# Patient Record
Sex: MALE | Race: WHITE
[De-identification: names, ages, dates, MRNs, and addresses within clinical notes are randomized per-mention and may not be internally consistent; named-entity substitution may affect disease eponyms.]

---

## 2018-03-11 ENCOUNTER — HOSPITAL ENCOUNTER (EMERGENCY)
Dept: HOSPITAL 47 - EC | Age: 58
Discharge: HOME | End: 2018-03-11
Payer: COMMERCIAL

## 2018-03-11 VITALS
HEART RATE: 63 BPM | SYSTOLIC BLOOD PRESSURE: 110 MMHG | DIASTOLIC BLOOD PRESSURE: 56 MMHG | RESPIRATION RATE: 17 BRPM | TEMPERATURE: 97.9 F

## 2018-03-11 DIAGNOSIS — Z87.891: ICD-10-CM

## 2018-03-11 DIAGNOSIS — N28.89: ICD-10-CM

## 2018-03-11 DIAGNOSIS — Z53.8: ICD-10-CM

## 2018-03-11 DIAGNOSIS — Z79.51: ICD-10-CM

## 2018-03-11 DIAGNOSIS — D72.829: ICD-10-CM

## 2018-03-11 DIAGNOSIS — I10: ICD-10-CM

## 2018-03-11 DIAGNOSIS — N43.3: ICD-10-CM

## 2018-03-11 DIAGNOSIS — K57.32: Primary | ICD-10-CM

## 2018-03-11 DIAGNOSIS — Z79.899: ICD-10-CM

## 2018-03-11 LAB
ANION GAP SERPL CALC-SCNC: 15 MMOL/L
BASOPHILS # BLD AUTO: 0.1 K/UL (ref 0–0.2)
BASOPHILS NFR BLD AUTO: 1 %
BUN SERPL-SCNC: 23 MG/DL (ref 9–20)
CALCIUM SPEC-MCNC: 9.8 MG/DL (ref 8.4–10.2)
CHLORIDE SERPL-SCNC: 104 MMOL/L (ref 98–107)
CO2 SERPL-SCNC: 19 MMOL/L (ref 22–30)
EOSINOPHIL # BLD AUTO: 0.2 K/UL (ref 0–0.7)
EOSINOPHIL NFR BLD AUTO: 2 %
ERYTHROCYTE [DISTWIDTH] IN BLOOD BY AUTOMATED COUNT: 5.23 M/UL (ref 4.3–5.9)
ERYTHROCYTE [DISTWIDTH] IN BLOOD: 12.5 % (ref 11.5–15.5)
GLUCOSE SERPL-MCNC: 147 MG/DL (ref 74–99)
HCT VFR BLD AUTO: 46 % (ref 39–53)
HGB BLD-MCNC: 15.8 GM/DL (ref 13–17.5)
LYMPHOCYTES # SPEC AUTO: 2.4 K/UL (ref 1–4.8)
LYMPHOCYTES NFR SPEC AUTO: 21 %
MCH RBC QN AUTO: 30.2 PG (ref 25–35)
MCHC RBC AUTO-ENTMCNC: 34.4 G/DL (ref 31–37)
MCV RBC AUTO: 87.8 FL (ref 80–100)
MONOCYTES # BLD AUTO: 0.7 K/UL (ref 0–1)
MONOCYTES NFR BLD AUTO: 6 %
NEUTROPHILS # BLD AUTO: 7.9 K/UL (ref 1.3–7.7)
NEUTROPHILS NFR BLD AUTO: 69 %
PH UR: 5 [PH] (ref 5–8)
PLATELET # BLD AUTO: 310 K/UL (ref 150–450)
POTASSIUM SERPL-SCNC: 3.9 MMOL/L (ref 3.5–5.1)
RBC UR QL: 1 /HPF (ref 0–5)
SODIUM SERPL-SCNC: 138 MMOL/L (ref 137–145)
SP GR UR: 1.02 (ref 1–1.03)
UROBILINOGEN UR QL STRIP: <2 MG/DL (ref ?–2)
WBC # BLD AUTO: 11.4 K/UL (ref 3.8–10.6)
WBC #/AREA URNS HPF: 4 /HPF (ref 0–5)

## 2018-03-11 PROCEDURE — 96372 THER/PROPH/DIAG INJ SC/IM: CPT

## 2018-03-11 PROCEDURE — 93975 VASCULAR STUDY: CPT

## 2018-03-11 PROCEDURE — 76870 US EXAM SCROTUM: CPT

## 2018-03-11 PROCEDURE — 36415 COLL VENOUS BLD VENIPUNCTURE: CPT

## 2018-03-11 PROCEDURE — 80048 BASIC METABOLIC PNL TOTAL CA: CPT

## 2018-03-11 PROCEDURE — 74177 CT ABD & PELVIS W/CONTRAST: CPT

## 2018-03-11 PROCEDURE — 96374 THER/PROPH/DIAG INJ IV PUSH: CPT

## 2018-03-11 PROCEDURE — 87086 URINE CULTURE/COLONY COUNT: CPT

## 2018-03-11 PROCEDURE — 99284 EMERGENCY DEPT VISIT MOD MDM: CPT

## 2018-03-11 PROCEDURE — 76770 US EXAM ABDO BACK WALL COMP: CPT

## 2018-03-11 PROCEDURE — 96376 TX/PRO/DX INJ SAME DRUG ADON: CPT

## 2018-03-11 PROCEDURE — 85025 COMPLETE CBC W/AUTO DIFF WBC: CPT

## 2018-03-11 PROCEDURE — 96375 TX/PRO/DX INJ NEW DRUG ADDON: CPT

## 2018-03-11 PROCEDURE — 81001 URINALYSIS AUTO W/SCOPE: CPT

## 2018-03-11 NOTE — ED
Abdominal Pain HPI





- General


Chief Complaint: Abdominal Pain


Stated Complaint: abdominal pain


Time Seen by Provider: 03/11/18 15:31


Source: patient, family (wife)


Mode of arrival: ambulatory


Limitations: no limitations





- History of Present Illness


Initial Comments: 


Patient presents with suprapubic abdominal pain for the past 4 hours.  Patient 

states it feels like gas pains.  States pain is intermittent.  States pain 

radiates down his penis and to region between scrotum and rectum. Pain worse 

when laying flat when he "stretches out the area".  Pain started after he went 

to the bathroom where he had a bowel movement and urinated.  Patient states 

urination has been normal, denies blood or frequency.  Patient denies penile 

discharge or testicular swelling or pain.  States he had 2 bowel movements today

, the first one was normal, second one was small amount of diarrhea.  She 

denies blood in urine or stools.  Patient denies history of abdominal surgeries 

or abdominal disease.  Patient denies history of kidney stones, flank pain, 

rectal pain.  Patient denies fevers, chills, nausea, vomiting.  Patient states 

he had a similar episode of pain 3 weeks ago was less severe and resolved on 

its own.








- Related Data


 Home Medications











 Medication  Instructions  Recorded  Confirmed


 


Benazepril/Hydrochlorothiazide 1 tab PO DAILY 04/24/15 03/11/18





[Lotensin Hct 20-12.5 mg Tablet]   


 


Fluticasone Nasal Spray [Flonase 1 spray EA NOSTRIL BID 03/11/18 03/11/18





Nasal Spray]   








 Previous Rx's











 Medication  Instructions  Recorded


 


Acetaminophen-Codeine 300-30mg 1 tab PO Q4H PRN 2 Days #12 tablet 03/11/18





[Tylenol #3]  


 


Amoxic-Pot Clav 875-125Mg 1 tab PO Q8HR 10 Days #30 tablet 03/11/18





[Augmentin 875-125]  


 


Ibuprofen [Motrin] 600 mg PO Q6HR PRN #20 tab 03/11/18











 Allergies











Allergy/AdvReac Type Severity Reaction Status Date / Time


 


No Known Allergies Allergy   Verified 03/11/18 16:02














Review of Systems


ROS Statement: 


Those systems with pertinent positive or pertinent negative responses have been 

documented in the HPI.





ROS Other: All systems not noted in ROS Statement are negative.


Constitutional: Denies: fever, chills, weakness


ENT: Denies: congestion


Respiratory: Denies: dyspnea


Cardiovascular: Denies: chest pain


Endocrine: Denies: fatigue


Gastrointestinal: Reports: abdominal pain, diarrhea.  Denies: nausea, vomiting, 

constipation, hematemesis, melena, hematochezia


Genitourinary: Reports: other (suprapubic pain radiating down penis).  Denies: 

urgency, dysuria, frequency, hematuria, discharge, testicular pain, testicular 

mass


Musculoskeletal: Denies: back pain


Skin: Denies: rash, change in color


Neurological: Denies: headache





Past Medical History


Past Medical History: Hypertension


History of Any Multi-Drug Resistant Organisms: None Reported


Past Surgical History: No Surgical Hx Reported


Additional Past Surgical History / Comment(s): SINUS SURG


Past Anesthesia/Blood Transfusion Reactions: Motion Sickness, Postoperative 

Nausea & Vomiting (PONV)


Past Psychological History: No Psychological Hx Reported


Smoking Status: Former smoker


Past Alcohol Use History: Occasional


Past Drug Use History: None Reported





- Past Family History


  ** Mother


Family Medical History: No Reported History





General Exam





- General Exam Comments


Initial Comments: 


Sitting up in bed.  No acute distress.  Conversing normally.  Calm, pleasant.  

Well appearing.





Limitations: no limitations


Head exam: Present: atraumatic, normocephalic


Eye exam: Present: normal appearance, PERRL, EOMI


ENT exam: Present: mucous membranes moist


Neck exam: Present: normal inspection


Respiratory exam: Present: normal lung sounds bilaterally.  Absent: respiratory 

distress, wheezes, rales, rhonchi, stridor


Cardiovascular Exam: Present: regular rate, normal rhythm


GI/Abdominal exam: Present: soft, tenderness (mild suprapubic TTP).  Absent: 

distended, guarding, hernia (No hernias appreciated)


 exam: Present: normal inspection, circumcision, other (Current testicular 

swelling or masses appreciated.  Normal testicular lie.  No hernias 

appreciated.  Penis and scrotum appear normal.  No rashes or lesions noted.  No 

abnormalities of the perineum appreciated on visual inspection.).  Absent: 

testicular tenderness, urethral discharge, scrotal swelling


Extremities exam: Present: other (No gross deformities)


Back exam: Present: normal inspection


Neurological exam: Present: alert, oriented X3


Psychiatric exam: Present: normal affect, normal mood


Skin exam: Present: warm, dry, intact, normal color.  Absent: rash, cyanosis, 

diaphoretic, erythema





Course


 Vital Signs











  03/11/18 03/11/18





  15:25 20:20


 


Temperature 97.0 F L 97.9 F


 


Pulse Rate 73 63


 


Respiratory 20 17





Rate  


 


Blood Pressure 144/97 110/56


 


O2 Sat by Pulse 99 100





Oximetry  














Medical Decision Making





- Medical Decision Making


Toradol, morphine, Bentyl ordered. 





US scrotum:


Ultrasound shows increased vascular flow to right testicle and epididymis 

suggestive mild right epididymoorchitis.  Small hydroceles are also 

incidentally noted, right greater than left





Mild elevation of WBC, UA negative, will get CT for possible intraabd 

infection. 





Pt with continued pain, 2nd dose morphine given. 





US abd:


CT shows acute uncomplicated sigmoid diverticulitis, mild.  Also shows 

suspicious solid right inferior pole renal mass





Pt and family updated with all results.  Symptoms likely secondary to 

diverticulitis.  At this time UA negative, do not feel patient has orchitis.  

Normal genital physical exam.  Diverticulitis.  Mild unconjugated, patient 

agrees with child outpatient treatment with antibiotics and Tylenol 3.  Patient 

also to take Motrin as needed.  Discussed return to ED if new or worsening 

symptoms including increased pain, fevers, at which time patient would need 

admitted for IV treatment.  Patient's family informed of right renal mass and 

need for follow with primary care physician for further management.  All 

questions answered.  Patient discharged home.





Patient feeling nauseated prior to discharge, request dose of antiemetics, 

Zofran given.  Nausea likely secondary to morphine.














- Lab Data


Result diagrams: 


 03/11/18 16:02





 03/11/18 16:02


 Lab Results











  03/11/18 03/11/18 03/11/18 Range/Units





  16:02 16:02 16:02 


 


WBC   11.4 H   (3.8-10.6)  k/uL


 


RBC   5.23   (4.30-5.90)  m/uL


 


Hgb   15.8   (13.0-17.5)  gm/dL


 


Hct   46.0   (39.0-53.0)  %


 


MCV   87.8   (80.0-100.0)  fL


 


MCH   30.2   (25.0-35.0)  pg


 


MCHC   34.4   (31.0-37.0)  g/dL


 


RDW   12.5   (11.5-15.5)  %


 


Plt Count   310   (150-450)  k/uL


 


Neutrophils %   69   %


 


Lymphocytes %   21   %


 


Monocytes %   6   %


 


Eosinophils %   2   %


 


Basophils %   1   %


 


Neutrophils #   7.9 H   (1.3-7.7)  k/uL


 


Lymphocytes #   2.4   (1.0-4.8)  k/uL


 


Monocytes #   0.7   (0-1.0)  k/uL


 


Eosinophils #   0.2   (0-0.7)  k/uL


 


Basophils #   0.1   (0-0.2)  k/uL


 


Sodium  138    (137-145)  mmol/L


 


Potassium  3.9    (3.5-5.1)  mmol/L


 


Chloride  104    ()  mmol/L


 


Carbon Dioxide  19 L    (22-30)  mmol/L


 


Anion Gap  15    mmol/L


 


BUN  23 H    (9-20)  mg/dL


 


Creatinine  0.85    (0.66-1.25)  mg/dL


 


Est GFR (CKD-EPI)AfAm  >90    (>60 ml/min/1.73 sqM)  


 


Est GFR (CKD-EPI)NonAf  >90    (>60 ml/min/1.73 sqM)  


 


Glucose  147 H    (74-99)  mg/dL


 


Calcium  9.8    (8.4-10.2)  mg/dL


 


Urine Color    Yellow  


 


Urine Appearance    Clear  (Clear)  


 


Urine pH    5.0  (5.0-8.0)  


 


Ur Specific Gravity    1.017  (1.001-1.035)  


 


Urine Protein    Negative  (Negative)  


 


Urine Glucose (UA)    Negative  (Negative)  


 


Urine Ketones    Negative  (Negative)  


 


Urine Blood    Trace H  (Negative)  


 


Urine Nitrite    Negative  (Negative)  


 


Urine Bilirubin    Negative  (Negative)  


 


Urine Urobilinogen    <2.0  (<2.0)  mg/dL


 


Ur Leukocyte Esterase    Small H  (Negative)  


 


Urine RBC    1  (0-5)  /hpf


 


Urine WBC    4  (0-5)  /hpf


 


Urine Mucus    Rare H  (None)  /hpf














Disposition


Clinical Impression: 


 Diverticulitis large intestine, Right kidney mass





Disposition: HOME SELF-CARE


Condition: Good


Instructions:  Diverticulitis (ED)


Additional Instructions: 


Follow-up to her primary care physician for reevaluation and further workup of 

kidney mass.  Return to ER for new or worsening symptoms including fevers, 

increased pain.


Prescriptions: 


Acetaminophen-Codeine 300-30mg [Tylenol #3] 1 tab PO Q4H PRN 2 Days #12 tablet


 PRN Reason: Severe Pain


Amoxic-Pot Clav 875-125Mg [Augmentin 875-125] 1 tab PO Q8HR 10 Days #30 tablet


Ibuprofen [Motrin] 600 mg PO Q6HR PRN #20 tab


 PRN Reason: Mild Pain


Referrals: 


Chela Daniel MD [Primary Care Provider] - 1-2 days

## 2018-03-11 NOTE — US
EXAMINATION TYPE: US scrotum with doppler.  Grayscale and color Doppler Duplex imaging performed of t
chidi scrotum.

 

DATE OF EXAM: 3/11/2018

 

COMPARISON: NONE

 

CLINICAL HISTORY: Pain. Generalized pain, no swelling, 

 

 

EXAM MEASUREMENTS:

 

TESTICLES:

Right Testicle:  4.2 x 3.2 x 2.5  cm

Left Testicle:  3.7 x 3.1 x 2.4 cm

 

EPIDIDYMIS HEAD:

Right Epididymis:  1.1 x 1.4 x 0.9 cm

Left Epididymis:  0.9 x 1.2 x 0.7  cm  

 

Doppler performed to assess for testicular vascularity; good bilateral color flow and waveforms are s
een with bright blood flow slightly greater than left.   There is no evidence of testicular torsion.

 

Presence of hydroceles:  Bilateral, right > left

 

 

Right epididymis appears echogenic and heterogenous. No prominent testicular lesions or masses.  

 

 

 

IMPRESSION: Increased vascular flow to the right testicle and epididymis heterogeneity suggests mild 
right epididymoorchitis. Small hydroceles are also incidentally noted, right greater than left.

## 2018-03-11 NOTE — CT
EXAMINATION TYPE: CT abdomen pelvis w con

 

DATE OF EXAM: 3/11/2018

 

COMPARISON: NONE

 

HISTORY: Patient complains of subumbilical pain that extends to the scrotum.

 

CT DLP: 887.9 mGycm

Automated exposure control for dose reduction was used.

 

TECHNIQUE:  Helical acquisition of images was performed from the lung bases through the pelvis.

 

CONTRAST: 

Performed without Oral Contrast and with IV Contrast, patient injected with 100 mL of Omnipaque 300.

 

FINDINGS: 

 

LUNG BASES: There is a small hiatal hernia and scattered areas of subsegmental atelectasis.

 

LIVER/GB: No significant abnormality is appreciated. No cholelithiasis.

 

PANCREAS: No significant abnormality is seen. No ductal dilatation.

 

SPLEEN: No significant abnormality is seen. No splenomegaly.

 

ADRENALS: No significant abnormality is seen.

 

KIDNEYS: There is a suspicious solid mass of the inferior pole the right kidney emanating posteriorly
 measuring 2.5 x 3.0 x 3.2 cm and anterior posterior by transverse by craniocaudal dimension on serie
s 3 image 33 and series 8 image 72. Other scattered bilateral hypoattenuated renal lesions that are s
ubcentimeter are too small to accurately characterize. Right upper pole nearly fluid attenuated 1.3 c
m lesion representing probable renal cyst with pseudoenhancement. Renal ultrasound could be performed
 for confirmation. No gross evidence of renal vein thrombosis. The primary mass of the right lower po
le does abut and inferior minor and major calyx.

 

FREE AIR:  No free air is visualized.

 

REPRODUCTIVE ORGANS: No significant abnormality is seen

 

URINARY BLADDER:  No significant abnormality is seen.

 

ADENOPATHY:  No greater than 1 cm short axis lymph nodes are seen within the abdomen or pelvis. Just 
inferior to the portacaval region anterior to the inferior vena cava on series 3 image 24 there is a 
prominent 8 mm short axis lymph node.

 

OSSEOUS STRUCTURES:  Osseous structures are intact.

 

BOWEL:  Mild pericolonic fat stranding is seen of the sigmoid colon surrounding multiple diverticula 
with fascial thickening. No pericolonic fluid collection or foci of free air. No ascites. There is lo
ng segment bowel wall thickening of the sigmoid colon. Findings are limited to the sigmoid colon with
 no proximal obstruction or dilation. Appendix is air-filled and within normal limits.

 

IMPRESSION: 

 

1. ACUTE UNCOMPLICATED SIGMOID DIVERTICULITIS, OVERALL MILD IN DEGREE.

2. SUSPICIOUS SOLID RIGHT INFERIOR POLE RENAL MASS. THIS SHOULD BE CONSIDERED RENAL CELL CARCINOMA UN
TIL PROVEN OTHERWISE ALTHOUGH ONCOCYTOMA IS ALSO IN THE DIFFERENTIAL. ADDITIONAL BILATERAL HYPOATTENU
ATED RENAL LESIONS ARE TOO SMALL TO ACCURATELY CHARACTERIZE BUT LIKELY REPRESENT CYSTS. UPPER POLE RI
GHT RENAL LESION LIKELY REPRESENTS A CYST WITH SIGNAL ENHANCEMENT AND COULD BE CONFIRMED WITH ULTRASO
UND.

3.

## 2018-03-11 NOTE — US
EXAMINATION TYPE: US kidneys/renal and bladder

 

DATE OF EXAM: 3/11/2018

 

COMPARISON: NONE

 

CLINICAL HISTORY: Pain. Pain radiating to scrotum.  No hx of renal stones. 

 

EXAM MEASUREMENTS:

 

Right Kidney:  9.8 x 5.3 x 5.4 cm

Left Kidney: 10.1 x 5.4 x 5.6 cm

 

 

 

Right Kidney: wnl  

Left Kidney: Lateral subcentimeter appearing lesion = 0.6 x 0.7 x 0.6 cm. Although this is too small 
to characterize and slightly hypoechoic.  Prominent column of champ   

Bladder: distended, wnl

**Bilateral Jets seen

 

 

There is no evidence for hydronephrosis at this point in time.  No nephrolithiasis is seen.    The ur
inary bladder is anechoic.  Bilateral ureteral jets are seen.

 

 

 

IMPRESSION:

 

1. No evidence of hydronephrosis or nephrolithiasis.

2. Subcentimeter left renal lesion that is too small to characterize.

## 2018-03-19 ENCOUNTER — HOSPITAL ENCOUNTER (OUTPATIENT)
Dept: HOSPITAL 47 - LABWHC1 | Age: 58
Discharge: HOME | End: 2018-03-19
Payer: COMMERCIAL

## 2018-03-19 DIAGNOSIS — D41.01: Primary | ICD-10-CM

## 2018-03-19 LAB
ALBUMIN SERPL-MCNC: 4.4 G/DL (ref 3.5–5)
ALP SERPL-CCNC: 74 U/L (ref 38–126)
ALT SERPL-CCNC: 74 U/L (ref 21–72)
AST SERPL-CCNC: 43 U/L (ref 17–59)
BILIRUB INDIRECT SERPL-MCNC: 0 MG/DL (ref 0–1.1)
BILIRUBIN DIRECT+TOT PNL SERPL-MCNC: 0.3 MG/DL (ref 0–0.2)
PROT SERPL-MCNC: 7.5 G/DL (ref 6.3–8.2)

## 2018-03-19 PROCEDURE — 80076 HEPATIC FUNCTION PANEL: CPT

## 2018-03-19 PROCEDURE — 71046 X-RAY EXAM CHEST 2 VIEWS: CPT

## 2018-03-19 PROCEDURE — 36415 COLL VENOUS BLD VENIPUNCTURE: CPT

## 2018-03-19 NOTE — XR
EXAMINATION TYPE: XR chest 2V

 

DATE OF EXAM: 3/19/2018

 

COMPARISON: NONE

 

TECHNIQUE: PA and lateral views submitted.

 

HISTORY: Renal mass

 

FINDINGS:

The lungs are clear and  there is no pneumothorax, pleural effusion, or focal pneumonia.  No overt fa
ilure. Chronic left clavicular deformity.

 

IMPRESSION: 

1. No acute process.

## 2019-10-10 ENCOUNTER — HOSPITAL ENCOUNTER (OUTPATIENT)
Dept: HOSPITAL 47 - RADCTMAIN | Age: 59
Discharge: HOME | End: 2019-10-10
Attending: INTERNAL MEDICINE
Payer: COMMERCIAL

## 2019-10-10 DIAGNOSIS — K44.9: ICD-10-CM

## 2019-10-10 DIAGNOSIS — N28.89: ICD-10-CM

## 2019-10-10 DIAGNOSIS — K57.30: Primary | ICD-10-CM

## 2019-10-10 LAB — BUN SERPL-SCNC: 18 MG/DL (ref 9–20)

## 2019-10-10 PROCEDURE — 82565 ASSAY OF CREATININE: CPT

## 2019-10-10 PROCEDURE — 84520 ASSAY OF UREA NITROGEN: CPT

## 2019-10-10 PROCEDURE — 74177 CT ABD & PELVIS W/CONTRAST: CPT

## 2019-10-10 PROCEDURE — 36415 COLL VENOUS BLD VENIPUNCTURE: CPT

## 2019-10-10 NOTE — CT
EXAMINATION TYPE: CT abdomen pelvis w con

 

DATE OF EXAM: 10/10/2019

 

COMPARISON: 3/11/2018

 

HISTORY: 59-year-old male generalized pain

 

TECHNIQUE: Contiguous axial scanning of the abdomen and pelvis following administration of 100 ml Iso
thom 300 IV contrast.  Delayed images through the kidneys and coronal/sagittal reconstructions perform
ed.

 

CT DLP: 1153 mGycm

Automated exposure control for dose reduction was used.

 

 

FINDINGS:

Heart normal size without pericardial effusion. Lung bases clear without pleural effusion.

 

Tiny hiatal hernia.

 

Liver normal size with decreased attenuation suggesting underlying fatty infiltration. No biliary dariana
gilberto dilatation. Portal venous system is patent.

 

Gallbladder, adrenal glands, spleen, and pancreas appear within normal limits.

 

1.1 cm cortical hypodensity posterior left kidney is unchanged suggestive of a small cortical cyst.

 

The previous posterior right renal mass has been excised in the interval. There is cortical defect an
d some heterogeneous density in this region suggesting postsurgical change. No suspicious nodularity 
is identified.

 

Mild nodular thickening of the left adrenal gland up to 1 cm thick is new from 3/11/2018. Follow-up i
s recommended.

 

Scattered nonenlarged mesenteric lymph nodes.

 

No dilated small bowel, free fluid, or free air.

 

Normal appendix. Moderate stool burden. Oral contrast progressed to the hepatic flexure.

 

Extensive sigmoid diverticulosis. Minimal fat stranding adjacent to the mid sigmoid colon, axial imag
e 65 probably represents prominent pericolonic vessels.

 

Bladder urine distended. Prostate gland measures 4.7 cm wide. No abnormal fluid collection in the pel
vis or pelvic lymphadenopathy.

 

Bones: Facet arthropathy lower lumbar spine. No osseous destructive process.

 

 

 

IMPRESSION: 

 

1. EXTENSIVE SIGMOID DIVERTICULOSIS. MILD FAT STRANDING ALONG THE MID SIGMOID COLON PROBABLY REPRESEN
TS PROMINENT PERICOLONIC VESSELS OR SCARRING FROM PRIOR EPISODES OF DIVERTICULITIS. MINIMAL INFLAMMAT
ION RELATING TO MILD ACUTE DIVERTICULITIS DIFFICULT TO ENTIRELY EXCLUDE.

 

2. INTERVAL RESECTION OF THE PATIENT'S PREVIOUS RIGHT RENAL MASS. THERE IS CORTICAL DEFECT. SOME HETE
ROGENEOUS DENSITY, LIKELY POSTSURGICAL CHANGE. APPROPRIATE UROLOGIC FOLLOW-UP RECOMMENDED.

 

3. NEW NODULAR THICKENING OF THE LEFT ADRENAL GLAND MEASURING UP TO 1 CM. 6 MONTH FOLLOW-UP CT RECOMM
ENDED TO REASSESS.

 

4. TINY HIATAL HERNIA.

## 2020-06-19 ENCOUNTER — HOSPITAL ENCOUNTER (OUTPATIENT)
Dept: HOSPITAL 47 - RADCTMAIN | Age: 60
Discharge: HOME | End: 2020-06-19
Attending: FAMILY MEDICINE
Payer: COMMERCIAL

## 2020-06-19 DIAGNOSIS — E27.8: Primary | ICD-10-CM

## 2020-06-19 DIAGNOSIS — Z98.890: ICD-10-CM

## 2020-06-19 PROCEDURE — 74170 CT ABD WO CNTRST FLWD CNTRST: CPT

## 2020-06-21 NOTE — CT
EXAMINATION TYPE: CT adrenal glands wo/w con

 

DATE OF EXAM: 6/19/2020

 

COMPARISON: 10/10/2019 and 3/11/2018

 

HISTORY: 60-year-old male adrenal mass, history of renal cell carcinoma

 

TECHNIQUE: Contiguous axial scanning of the abdomen performed without and with IV Contrast, patient i
njected with 100 mL of Isovue 300. Delayed images were also obtained per adrenal mass protocol. Coron
al/sagittal reconstructions performed.

 

CT DLP: 2187 mGycm

Automated exposure control for dose reduction was used.

 

FINDINGS: 

The heart is normal size without pericardial effusion. Lung bases clear without pleural effusion.

 

Small hiatal hernia.

 

Diffuse low-density of the liver compatible with fatty infiltration. Portal venous system is patent. 
No biliary ductal dilatation.

 

Gallbladder, spleen, pancreas appear within normal limits.

 

Very subtle new 8 mm nodularity right adrenal gland difficult to exclude at this time.

 

Stable postsurgical changes of partial nephrectomy along the posterior lower pole of the right kidney
. Stable 9 mm centrally located hypodensity in the midpole the right kidney suggestive of a cyst.

 

Stable 1.5 cm cortical cyst posterior midpole left kidney.

 

Continued nodular enlargement of the left adrenal gland measuring 3.4 cm versus 2.7 cm, previously. T
hickening medially measures up to 1.8 cm and laterally at 1.4 cm (versus 1.1 and 0.9 cm, previously).


 

Noncontrast attenuation is 38 Hounsfield units.

Postcontrast enhancement is 84 Hounsfield units.

Delayed enhancement is 51 Hounsfield units.

 

No mesenteric or retroperitoneal lymphadenopathy. No dilated small bowel, free fluid, or free air.

 

Normal appendix. Oral contrast progressed to the hepatic flexure. Mild-to-moderate stool. Extensive s
igmoid diverticulosis. No pericolonic inflammatory change.

 

Mild circumferential bladder wall thickening. Prostate gland again noted to be enlarged measuring 5.0
 cm wide. No abnormal fluid collection the pelvis or pelvic lymphadenopathy.

 

Bones: No osseous destructive process.

 

 

IMPRESSION: 

 

1. CONTINUED NODULAR ENLARGEMENT OF THE LEFT ADRENAL GLAND CURRENTLY MEASURING UP TO 3.4 CM VERSUS 2.
7 CM ON 10/10/2019 AND NEW AS COMPARED TO 3/11/2018. NOTE THAT WASHOUT MEASUREMENTS MAY HAVE SIGNIFIC
ANTLY REDUCED SPECIFICITY IN PATIENTS WITH RENAL CELL CARCINOMA. SO, ADRENAL METASTASES CANNOT BE EXC
LUDED DESPITE THE ABSOLUTE WASHOUT OF GREATER THAN 60%.

 

2. DEVELOPMENT OF SUBTLE NEW 8 MM NODULARITY OF THE RIGHT ADRENAL GLAND.

 

3. STABLE POSTRESECTION CHANGES ALONG THE LOWER POLE RIGHT KIDNEY. NO LOCAL RECURRENCE IDENTIFIED.

 

4. SMALL HIATAL HERNIA AND SIGMOID DIVERTICULOSIS. PROSTATOMEGALY AT 5.0 CM WIDE.

## 2020-07-21 ENCOUNTER — HOSPITAL ENCOUNTER (OUTPATIENT)
Dept: HOSPITAL 47 - LABPAT | Age: 60
Discharge: HOME | End: 2020-07-21
Attending: UROLOGY
Payer: COMMERCIAL

## 2020-07-21 DIAGNOSIS — Z01.818: Primary | ICD-10-CM

## 2020-07-21 DIAGNOSIS — E27.9: ICD-10-CM

## 2020-07-21 LAB
ALBUMIN SERPL-MCNC: 4.5 G/DL (ref 3.5–5)
ALP SERPL-CCNC: 49 U/L (ref 38–126)
ALT SERPL-CCNC: 49 U/L (ref 4–49)
ANION GAP SERPL CALC-SCNC: 8 MMOL/L
AST SERPL-CCNC: 36 U/L (ref 17–59)
BASOPHILS # BLD AUTO: 0.1 K/UL (ref 0–0.2)
BASOPHILS NFR BLD AUTO: 1 %
BUN SERPL-SCNC: 19 MG/DL (ref 9–20)
CALCIUM SPEC-MCNC: 9.5 MG/DL (ref 8.4–10.2)
CHLORIDE SERPL-SCNC: 102 MMOL/L (ref 98–107)
CO2 SERPL-SCNC: 28 MMOL/L (ref 22–30)
EOSINOPHIL # BLD AUTO: 0.2 K/UL (ref 0–0.7)
EOSINOPHIL NFR BLD AUTO: 2 %
ERYTHROCYTE [DISTWIDTH] IN BLOOD BY AUTOMATED COUNT: 4.85 M/UL (ref 4.3–5.9)
ERYTHROCYTE [DISTWIDTH] IN BLOOD: 12.7 % (ref 11.5–15.5)
GLUCOSE SERPL-MCNC: 138 MG/DL (ref 74–99)
HCT VFR BLD AUTO: 44.9 % (ref 39–53)
HGB BLD-MCNC: 15.2 GM/DL (ref 13–17.5)
LYMPHOCYTES # SPEC AUTO: 2.9 K/UL (ref 1–4.8)
LYMPHOCYTES NFR SPEC AUTO: 34 %
MCH RBC QN AUTO: 31.4 PG (ref 25–35)
MCHC RBC AUTO-ENTMCNC: 33.9 G/DL (ref 31–37)
MCV RBC AUTO: 92.6 FL (ref 80–100)
MONOCYTES # BLD AUTO: 0.7 K/UL (ref 0–1)
MONOCYTES NFR BLD AUTO: 8 %
NEUTROPHILS # BLD AUTO: 4.5 K/UL (ref 1.3–7.7)
NEUTROPHILS NFR BLD AUTO: 53 %
PLATELET # BLD AUTO: 256 K/UL (ref 150–450)
POTASSIUM SERPL-SCNC: 4.5 MMOL/L (ref 3.5–5.1)
PROT SERPL-MCNC: 7.4 G/DL (ref 6.3–8.2)
SODIUM SERPL-SCNC: 138 MMOL/L (ref 137–145)
WBC # BLD AUTO: 8.5 K/UL (ref 3.8–10.6)

## 2020-07-21 PROCEDURE — 36415 COLL VENOUS BLD VENIPUNCTURE: CPT

## 2020-07-21 PROCEDURE — 80053 COMPREHEN METABOLIC PANEL: CPT

## 2020-07-21 PROCEDURE — 85025 COMPLETE CBC W/AUTO DIFF WBC: CPT

## 2020-08-13 ENCOUNTER — HOSPITAL ENCOUNTER (OUTPATIENT)
Dept: HOSPITAL 47 - OR | Age: 60
Setting detail: OBSERVATION
LOS: 1 days | Discharge: HOME | End: 2020-08-14
Attending: UROLOGY | Admitting: UROLOGY
Payer: COMMERCIAL

## 2020-08-13 VITALS — BODY MASS INDEX: 32.5 KG/M2

## 2020-08-13 DIAGNOSIS — C79.72: Primary | ICD-10-CM

## 2020-08-13 DIAGNOSIS — Z90.5: ICD-10-CM

## 2020-08-13 DIAGNOSIS — C64.1: ICD-10-CM

## 2020-08-13 DIAGNOSIS — Z79.899: ICD-10-CM

## 2020-08-13 DIAGNOSIS — Z82.61: ICD-10-CM

## 2020-08-13 DIAGNOSIS — Z87.891: ICD-10-CM

## 2020-08-13 DIAGNOSIS — I10: ICD-10-CM

## 2020-08-13 DIAGNOSIS — N40.1: ICD-10-CM

## 2020-08-13 DIAGNOSIS — K66.0: ICD-10-CM

## 2020-08-13 PROCEDURE — 49329 UNLSTD LAPS PX ABD PERTM&OMN: CPT

## 2020-08-13 PROCEDURE — 86850 RBC ANTIBODY SCREEN: CPT

## 2020-08-13 PROCEDURE — 88313 SPECIAL STAINS GROUP 2: CPT

## 2020-08-13 PROCEDURE — 86900 BLOOD TYPING SEROLOGIC ABO: CPT

## 2020-08-13 PROCEDURE — 88342 IMHCHEM/IMCYTCHM 1ST ANTB: CPT

## 2020-08-13 PROCEDURE — 88307 TISSUE EXAM BY PATHOLOGIST: CPT

## 2020-08-13 PROCEDURE — 86901 BLOOD TYPING SEROLOGIC RH(D): CPT

## 2020-08-13 PROCEDURE — 60650 LAPAROSCOPY ADRENALECTOMY: CPT

## 2020-08-13 PROCEDURE — 88341 IMHCHEM/IMCYTCHM EA ADD ANTB: CPT

## 2020-08-13 RX ADMIN — HEPARIN SODIUM SCH UNIT: 5000 INJECTION, SOLUTION INTRAVENOUS; SUBCUTANEOUS at 23:47

## 2020-08-13 RX ADMIN — POTASSIUM CHLORIDE SCH: 14.9 INJECTION, SOLUTION INTRAVENOUS at 16:01

## 2020-08-13 RX ADMIN — POTASSIUM CHLORIDE SCH MLS: 14.9 INJECTION, SOLUTION INTRAVENOUS at 11:15

## 2020-08-13 RX ADMIN — POTASSIUM CHLORIDE SCH MLS/HR: 14.9 INJECTION, SOLUTION INTRAVENOUS at 16:19

## 2020-08-13 RX ADMIN — HEPARIN SODIUM SCH UNIT: 5000 INJECTION, SOLUTION INTRAVENOUS; SUBCUTANEOUS at 17:10

## 2020-08-13 RX ADMIN — POTASSIUM CHLORIDE SCH: 14.9 INJECTION, SOLUTION INTRAVENOUS at 20:36

## 2020-08-13 RX ADMIN — POTASSIUM CHLORIDE ONE MLS/HR: 14.9 INJECTION, SOLUTION INTRAVENOUS at 16:10

## 2020-08-13 RX ADMIN — KETOROLAC TROMETHAMINE SCH MG: 15 INJECTION, SOLUTION INTRAMUSCULAR; INTRAVENOUS at 23:51

## 2020-08-13 RX ADMIN — HYDROMORPHONE HYDROCHLORIDE PRN MG: 1 INJECTION, SOLUTION INTRAMUSCULAR; INTRAVENOUS; SUBCUTANEOUS at 20:38

## 2020-08-13 RX ADMIN — HYDROMORPHONE HYDROCHLORIDE PRN MG: 1 INJECTION, SOLUTION INTRAMUSCULAR; INTRAVENOUS; SUBCUTANEOUS at 14:15

## 2020-08-13 RX ADMIN — HYDROMORPHONE HYDROCHLORIDE PRN MG: 1 INJECTION, SOLUTION INTRAMUSCULAR; INTRAVENOUS; SUBCUTANEOUS at 14:24

## 2020-08-13 RX ADMIN — KETOROLAC TROMETHAMINE SCH MG: 15 INJECTION, SOLUTION INTRAMUSCULAR; INTRAVENOUS at 17:10

## 2020-08-13 RX ADMIN — POTASSIUM CHLORIDE ONE MLS: 14.9 INJECTION, SOLUTION INTRAVENOUS at 13:46

## 2020-08-13 RX ADMIN — POTASSIUM CHLORIDE SCH MLS/HR: 14.9 INJECTION, SOLUTION INTRAVENOUS at 23:48

## 2020-08-13 NOTE — P.OP
Date of Procedure: 08/13/20


Preoperative Diagnosis: 


Left adrenal mass


Postoperative Diagnosis: 


Extensive adhesions of omentum and large bowel


Left adrenal mass


Procedure(s) Performed: 


#1 robotic left partial adrenalectomy


#2 extensive lysis of adhesions


Implants: 


None


Anesthesia: GETA


Surgeon: Lakia Montes


Estimated Blood Loss (ml): 20


IV fluids (ml): 400


Urine output (ml): 100


Pathology: other (Left adrenal tumor)


Condition: stable


Disposition: PACU


Indications for Procedure: 


Enlarging left adrenal mass that increased from 2.64-3.7 cm.  Given the past 

history off kidney cancer, there was a suspicion that the adrenal tumor was a 

metastasis


Operative Findings: 


#1 there were extensive adhesions between the omentum and large bowel in the 

left upper quadrant.  These required an extensive lysis of adhesions with 

laparoscopy.  The adrenal tumor was seen in the suprarenal location.  Large 

pancreas


Description of Procedure: 


jin was evaluated in the office for an enlarging left adrenal mass.  With 

the suspicion of RCC metastasis elected to undergo a left partial adrenalectomy.

 All risks and complications were explained to him including bleeding, spleen 

pancreas injury, need for nephrectomy, etc





He was taken to the OR and administered general anesthesia and placed in the 

left lateral position.  All parts were padded.  He was prepped and draped.  

Using a Veress needle pneumoperitoneum was established to 20 mmHg.  A 8 mm 

robotic port was placed in the midclavicular line.  The 0 lens was introduced 

into the belly.  Inspection of the peritoneum revealed extensive omental and 

large bowel adhesions in the upper left quadrant of the abdomen.  2 additional 8

mm ports were placed in the midclavicular line below the camera port.  Using 

laparoscopic scissors the omental and large bowel adhesions were lysed.  The 

spleen was also stuck to the left lateral wall.  Once the adhesions were lysed. 

A fourth port was placed in the midclavicular line above the camera port.  The 

robot was docked and a monopolar scissor, fenestrated bipolar and Prograf 6 

forceps were used for the procedure.





The procedure was started by incising along the line of Toldt and the descending

colon and sigmoid colon were reflected medially.  The procedure was directed 

superiorly towards the splenic flexure.  The spleen was then identified and the 

lienorenal ligament was divided.  The entire large bowel was reflected medially 

to the level of the aorta.  Superiorly the spleen and the pancreas was 

completely reflected medially until the splenic vein and splenic artery was 

identified.  At this point the adrenal tumor could be seen and outlined.





The fourth arm was used to reflect the spleen and pancreas medially and the 

monopolar scissors and fenestrated bipolar was used to define a plane between 

the adrenal tumor and the renal hilum.  The splenic vein was identified and 

clipped on the body side and coagulated with the vessel sealer and divided.  A 

plane was then developed between the lower border of the adrenal mass and the 

kidney and renal hilum.  This plane was deepened down to the psoas muscle.  

Attention was then directed to the medial aspect of the adrenal mass and using 

vessel sealer was all the small bowel vessels entering the adrenal tumor were 

divided and coagulated.  The bipolar was used to conform hemostasis.





Once the inferior and medial margins of the adrenal tumor were freed attention 

was then directed to the superior margin between the normal adrenal gland and 

the adrenal tumor.  Using the vessel sealer the tumor was  from the 

normal adrenal gland and hemostasis was confirmed.  Care was taken to keep an 

adequate margin around the adrenal tumor.  Finally the lateral aspect of the 

adrenal tumor was divided with the vessel sealer.  Once the entire adrenal was 

freed it was placed in an Endo Catch bag and attention was then directed to the 

foci to conform hemostasis with the bipolar forceps.





10 mL of Tisseel were then placed in the adrenal fossa as an adjunct for 

hemostasis.  A Surgicel piece was also placed in that fossa.





The specimen was then delivered by enlarging the 12 mm assistant port.  The 12 

mm incision was then closed with figure of 8 of 0 Vicryl on a CT1 needle.  The 

subcu and skin were closed with 4-0 Monocryl.  And skin glue was applied.  The 

patient tolerated the procedure well and he was taken to recovery in stable 

condition

## 2020-08-14 VITALS
TEMPERATURE: 97.7 F | HEART RATE: 77 BPM | SYSTOLIC BLOOD PRESSURE: 147 MMHG | RESPIRATION RATE: 20 BRPM | DIASTOLIC BLOOD PRESSURE: 85 MMHG

## 2020-08-14 RX ADMIN — HYDROMORPHONE HYDROCHLORIDE PRN MG: 1 INJECTION, SOLUTION INTRAMUSCULAR; INTRAVENOUS; SUBCUTANEOUS at 07:54

## 2020-08-14 RX ADMIN — POTASSIUM CHLORIDE SCH MLS/HR: 14.9 INJECTION, SOLUTION INTRAVENOUS at 11:10

## 2020-08-14 RX ADMIN — HEPARIN SODIUM SCH UNIT: 5000 INJECTION, SOLUTION INTRAVENOUS; SUBCUTANEOUS at 07:48

## 2020-08-14 RX ADMIN — HYDROMORPHONE HYDROCHLORIDE PRN MG: 1 INJECTION, SOLUTION INTRAMUSCULAR; INTRAVENOUS; SUBCUTANEOUS at 02:29

## 2020-08-14 RX ADMIN — POTASSIUM CHLORIDE SCH: 14.9 INJECTION, SOLUTION INTRAVENOUS at 04:27

## 2020-08-14 RX ADMIN — HYDROMORPHONE HYDROCHLORIDE PRN MG: 1 INJECTION, SOLUTION INTRAMUSCULAR; INTRAVENOUS; SUBCUTANEOUS at 14:53

## 2020-08-14 RX ADMIN — POTASSIUM CHLORIDE SCH MLS/HR: 14.9 INJECTION, SOLUTION INTRAVENOUS at 06:09

## 2020-08-14 RX ADMIN — KETOROLAC TROMETHAMINE SCH MG: 15 INJECTION, SOLUTION INTRAMUSCULAR; INTRAVENOUS at 06:08

## 2020-08-14 RX ADMIN — KETOROLAC TROMETHAMINE SCH MG: 15 INJECTION, SOLUTION INTRAMUSCULAR; INTRAVENOUS at 11:09

## 2020-12-01 ENCOUNTER — HOSPITAL ENCOUNTER (OUTPATIENT)
Dept: HOSPITAL 47 - RADCTMAIN | Age: 60
Discharge: HOME | End: 2020-12-01
Attending: INTERNAL MEDICINE
Payer: COMMERCIAL

## 2020-12-01 DIAGNOSIS — E27.8: Primary | ICD-10-CM

## 2020-12-01 DIAGNOSIS — K57.30: ICD-10-CM

## 2020-12-01 DIAGNOSIS — N40.0: ICD-10-CM

## 2020-12-01 DIAGNOSIS — C64.1: ICD-10-CM

## 2020-12-01 DIAGNOSIS — K63.89: ICD-10-CM

## 2020-12-01 DIAGNOSIS — Z98.890: ICD-10-CM

## 2020-12-01 LAB — BUN SERPL-SCNC: 19 MG/DL (ref 9–20)

## 2020-12-01 PROCEDURE — 74177 CT ABD & PELVIS W/CONTRAST: CPT

## 2020-12-01 PROCEDURE — 71260 CT THORAX DX C+: CPT

## 2020-12-01 PROCEDURE — 82565 ASSAY OF CREATININE: CPT

## 2020-12-01 PROCEDURE — 36415 COLL VENOUS BLD VENIPUNCTURE: CPT

## 2020-12-01 PROCEDURE — 84520 ASSAY OF UREA NITROGEN: CPT

## 2020-12-02 NOTE — CT
EXAMINATION TYPE: CT ChestAbdPelvis w con

 

DATE OF EXAM: 12/1/2020

 

COMPARISON: 6/19/2020, 10/10/2019, 3/11/2018

 

HISTORY: 60-year-old male C64.1, follow-up Renal CA

 

TECHNIQUE: Contiguous axial scanning of the chest, abdomen, and pelvis performed with IV Contrast, pa
tient injected with 100 mL of Isovue 300. Delayed images through the kidneys were obtained. Coronal/s
agittal reconstructions performed.

 

CT DLP: 1539.6 mGycm

Automated exposure control for dose reduction was used.

 

FINDINGS: 

 

CHEST:

Heart normal size without pericardial effusion.

 

Ectatic ascending aorta 3.6 cm with bovine configuration to the aortic arch.

 

Trace bilateral gynecomastia. No thoracic lymphadenopathy by CT size criteria.

 

Some strandy atelectasis in the inferior lingula. No consolidation or pleural effusion.

 

 

 

ABDOMEN:

No focal liver lesion or biliary ductal dilatation. Portal venous system is patent.

 

Gallbladder, spleen, and pancreas appear within normal limits.

 

Stable 1.3 cm cortical cyst posterolateral left kidney.

 

Stable small postsurgical defect posteromedial aspect of the lower pole right kidney corresponding to
 the site of previous tumor resection. No suspicious mass or new nodularity is identified in this reg
ion.

 

The previous left adrenal mass has decreased in size previously measuring 3.4 x 1.5 cm and now measur
ing 2.0 x 1.1 cm and with lower density and less definition. Some surrounding strandy densities are p
resent as well. Query any interval treatment or surgery.

 

However, the right adrenal nodularity is stable to minimally more pronounced at 1.1 cm versus 8 mm, p
reviously.

 

Some scarring along the anterior abdominal wall related to prior laparoscopic portals.

 

No dilated small bowel, free fluid, or free air. No mesenteric or retroperitoneal lymphadenopathy.

 

Normal appendix. Oral contrast has progressed to the lower descending colon. Extensive sigmoid divert
iculosis. There is some wall thickening along the midsigmoid and some pericolonic fat stranding, refe
r to axial images 96 through 101.

 

 

PELVIS:

Bladder is urine distended. Prostate gland enlargement 5.2 cm wide. No abnormal fluid collection in t
he pelvis or pelvic lymphadenopathy.

 

 

BONES:

Hypertrophic facet arthropathy lower lumbar spine. No osseous destructive process.

 

 

 

IMPRESSION: 

 

1. STABLE POST RESECTION CHANGES ALONG THE LOWER POLE OF THE RIGHT KIDNEY. NO EVIDENCE FOR LOCAL RECU
RRENCE.

 

2. THE LEFT ADRENAL MASS HAS DECREASED IN SIZE MEASURING 2.0 X 1.1 CM (VERSUS 3.4 X 1.5 CM, PREVIOUSL
Y) AND NOW SHOWS LOWER DENSITY AND ILL-DEFINITION. QUERY ANY INTERVAL TREATMENT OR SURGERY. CONTINUED
 FOLLOW-UP RECOMMENDED.

 

3. HOWEVER, THE RIGHT ADRENAL NODULE IS STABLE TO MINIMALLY MORE PRONOUNCED AT 1.1 CM VERSUS 8 MM, NH
EVIOUSLY. AGAIN, CONTINUED FOLLOW-UP RECOMMENDED. 

 

4. REDEMONSTRATED SIGMOID DIVERTICULOSIS. THERE IS MILD PERICOLONIC FAT STRANDING NOW ALONG THE MID S
IGMOID WITH SOME WALL THICKENING. CORRELATE FOR MILD ACUTE DIVERTICULITIS. DIRECT VISUALIZATION WHEN 
PATIENT ABLE TO EXCLUDE UNDERLYING NEOPLASM.

 

5. PROSTATOMEGALY AT 5.2 CM WIDE.

## 2021-03-02 ENCOUNTER — HOSPITAL ENCOUNTER (OUTPATIENT)
Dept: HOSPITAL 47 - RADCTMAIN | Age: 61
Discharge: HOME | End: 2021-03-02
Attending: INTERNAL MEDICINE
Payer: COMMERCIAL

## 2021-03-02 DIAGNOSIS — C79.70: Primary | ICD-10-CM

## 2021-03-02 DIAGNOSIS — C64.1: ICD-10-CM

## 2021-03-02 DIAGNOSIS — K44.9: ICD-10-CM

## 2021-03-02 DIAGNOSIS — K57.30: ICD-10-CM

## 2021-03-02 DIAGNOSIS — Z98.890: ICD-10-CM

## 2021-03-02 DIAGNOSIS — E27.8: ICD-10-CM

## 2021-03-02 LAB — BUN SERPL-SCNC: 18 MG/DL (ref 9–20)

## 2021-03-02 PROCEDURE — 84520 ASSAY OF UREA NITROGEN: CPT

## 2021-03-02 PROCEDURE — 71260 CT THORAX DX C+: CPT

## 2021-03-02 PROCEDURE — 82565 ASSAY OF CREATININE: CPT

## 2021-03-02 PROCEDURE — 74177 CT ABD & PELVIS W/CONTRAST: CPT

## 2021-03-02 PROCEDURE — 36415 COLL VENOUS BLD VENIPUNCTURE: CPT

## 2021-03-02 NOTE — CT
EXAMINATION TYPE: CT ChestAbdPelvis w con

 

DATE OF EXAM: 3/2/2021

 

COMPARISON: 12/1/2020, 6/19/2020, 10/23/2019

 

HISTORY: 60-year-old male C6 4.1, history of renal cancer.

 

TECHNIQUE: Contiguous axial scanning of the chest, abdomen, and pelvis performed with IV Contrast, pa
tient injected with 100 mL of Isovue 300. Delayed images through the kidneys were obtained. Coronal/s
agittal reconstructions performed.

 

CT DLP: 1763 mGycm

Automated exposure control for dose reduction was used.

 

FINDINGS: 

CHEST:

Heart normal size without pericardial effusion.

 

Aorta normal caliber with bovine configuration to the aortic arch.

 

No thoracic lymphadenopathy by CT size criteria.

 

Some stable strandy scarring or atelectasis at the inferior lingula. Tiny calcified granuloma within 
the lingula just above, axial image 34. No consolidation or pleural effusion.

 

 

 

ABDOMEN:

Tiny hiatal hernia.

 

No focal liver lesion or biliary ductal dilatation. Portal venous system is patent.

 

Gallbladder, spleen, and pancreas appear within normal limits.

 

Postsurgical change posterior mid to lower pole right kidney is stable.

 

1.1 cm centrally located cyst within the mid right kidney unchanged from 6/19/2020.

 

1.4 cm cortical cyst lateral left kidney is unchanged.

 

Unchanged low density soft tissue thickening of the left adrenal gland at 2.0 x 1.0 cm (versus 2.0 x 
1.1 cm on 12/1/2020 and 3.4 x 1.5 cm on 6/19/2020).

 

1.1 cm right adrenal nodularity is unchanged from 12/1/2020.

 

No dilated small bowel, free fluid, or free air. No mesenteric or retroperitoneal lymphadenopathy.

 

Normal appendix. Oral contrast progressed to the rectum. There is sigmoid diverticulosis. No pericolo
valentine inflammatory change. Minimal stool burden.

 

 

PELVIS:

Bladder is urine distended. Prostate gland measures 5.0 cm wide. Tiny left-sided pelvic phlebolith. N
o abnormal fluid collection of pelvis or pelvic lymphadenopathy.

 

 

BONES:

Mild degenerative change at the left hip. Facet arthropathy lower lumbar spine. No osseous destructiv
e process.

 

 

 

 

IMPRESSION: 

 

1. STABLE POSTSURGICAL CHANGE ALONG THE MID TO LOWER POLE OF THE RIGHT KIDNEY.  NO EVIDENCE FOR LOCAL
 RECURRENCE.

 

2. STABLE LOW DENSITY THICKENING OF THE LEFT ADRENAL GLAND AT 2.0 X 1.0 CM, LIKELY SITE OF TREATED DI
SEASE.

 

3. 1.1 CM NODULARITY OF THE RIGHT ADRENAL GLAND STABLE FROM 12/1/2020. STABLE METASTASIS IS NOT EXCLU
DED AS THIS MEASURED 8 MM ON 6/19/2020 AND IS NEW FROM 10/10/2019. NO EVIDENCE FOR DISEASE PROGRESSIO
N.

 

4. TINY HIATAL HERNIA AND SIGMOID DIVERTICULOSIS.

## 2021-03-14 ENCOUNTER — HOSPITAL ENCOUNTER (OUTPATIENT)
Dept: HOSPITAL 47 - EC | Age: 61
Setting detail: OBSERVATION
LOS: 4 days | Discharge: HOME | End: 2021-03-18
Attending: HOSPITALIST | Admitting: HOSPITALIST
Payer: COMMERCIAL

## 2021-03-14 DIAGNOSIS — D72.829: ICD-10-CM

## 2021-03-14 DIAGNOSIS — D49.7: ICD-10-CM

## 2021-03-14 DIAGNOSIS — G47.00: ICD-10-CM

## 2021-03-14 DIAGNOSIS — Z85.528: ICD-10-CM

## 2021-03-14 DIAGNOSIS — K30: ICD-10-CM

## 2021-03-14 DIAGNOSIS — I10: ICD-10-CM

## 2021-03-14 DIAGNOSIS — Z79.899: ICD-10-CM

## 2021-03-14 DIAGNOSIS — Z20.822: ICD-10-CM

## 2021-03-14 DIAGNOSIS — E66.9: ICD-10-CM

## 2021-03-14 DIAGNOSIS — K57.30: ICD-10-CM

## 2021-03-14 DIAGNOSIS — K57.20: Primary | ICD-10-CM

## 2021-03-14 DIAGNOSIS — Z87.891: ICD-10-CM

## 2021-03-14 DIAGNOSIS — F41.9: ICD-10-CM

## 2021-03-14 LAB
ALBUMIN SERPL-MCNC: 4.7 G/DL (ref 3.5–5)
ALP SERPL-CCNC: 65 U/L (ref 38–126)
ALT SERPL-CCNC: 38 U/L (ref 4–49)
AMYLASE SERPL-CCNC: 64 U/L (ref 30–110)
ANION GAP SERPL CALC-SCNC: 10 MMOL/L
AST SERPL-CCNC: 37 U/L (ref 17–59)
BASOPHILS # BLD AUTO: 0 K/UL (ref 0–0.2)
BASOPHILS NFR BLD AUTO: 0 %
BUN SERPL-SCNC: 21 MG/DL (ref 9–20)
CALCIUM SPEC-MCNC: 9.8 MG/DL (ref 8.4–10.2)
CHLORIDE SERPL-SCNC: 103 MMOL/L (ref 98–107)
CO2 SERPL-SCNC: 24 MMOL/L (ref 22–30)
EOSINOPHIL # BLD AUTO: 0.6 K/UL (ref 0–0.7)
EOSINOPHIL NFR BLD AUTO: 3 %
ERYTHROCYTE [DISTWIDTH] IN BLOOD BY AUTOMATED COUNT: 5.37 M/UL (ref 4.3–5.9)
ERYTHROCYTE [DISTWIDTH] IN BLOOD: 13.1 % (ref 11.5–15.5)
GLUCOSE SERPL-MCNC: 121 MG/DL (ref 74–99)
HCT VFR BLD AUTO: 48.4 % (ref 39–53)
HGB BLD-MCNC: 16.2 GM/DL (ref 13–17.5)
LIPASE SERPL-CCNC: 224 U/L (ref 23–300)
LYMPHOCYTES # SPEC AUTO: 1.8 K/UL (ref 1–4.8)
LYMPHOCYTES NFR SPEC AUTO: 9 %
MCH RBC QN AUTO: 30.2 PG (ref 25–35)
MCHC RBC AUTO-ENTMCNC: 33.4 G/DL (ref 31–37)
MCV RBC AUTO: 90.3 FL (ref 80–100)
MONOCYTES # BLD AUTO: 0.8 K/UL (ref 0–1)
MONOCYTES NFR BLD AUTO: 4 %
NEUTROPHILS # BLD AUTO: 16.8 K/UL (ref 1.3–7.7)
NEUTROPHILS NFR BLD AUTO: 83 %
PH UR: 5.5 [PH] (ref 5–8)
PLATELET # BLD AUTO: 299 K/UL (ref 150–450)
POTASSIUM SERPL-SCNC: 4.1 MMOL/L (ref 3.5–5.1)
PROT SERPL-MCNC: 8 G/DL (ref 6.3–8.2)
RBC UR QL: 5 /HPF (ref 0–5)
SODIUM SERPL-SCNC: 137 MMOL/L (ref 137–145)
SP GR UR: 1.02 (ref 1–1.03)
UROBILINOGEN UR QL STRIP: <2 MG/DL (ref ?–2)
WBC # BLD AUTO: 20.1 K/UL (ref 3.8–10.6)
WBC # UR AUTO: 2 /HPF (ref 0–5)

## 2021-03-14 PROCEDURE — 96365 THER/PROPH/DIAG IV INF INIT: CPT

## 2021-03-14 PROCEDURE — 82150 ASSAY OF AMYLASE: CPT

## 2021-03-14 PROCEDURE — 85025 COMPLETE CBC W/AUTO DIFF WBC: CPT

## 2021-03-14 PROCEDURE — 80053 COMPREHEN METABOLIC PANEL: CPT

## 2021-03-14 PROCEDURE — 99285 EMERGENCY DEPT VISIT HI MDM: CPT

## 2021-03-14 PROCEDURE — 74177 CT ABD & PELVIS W/CONTRAST: CPT

## 2021-03-14 PROCEDURE — 83690 ASSAY OF LIPASE: CPT

## 2021-03-14 PROCEDURE — 87635 SARS-COV-2 COVID-19 AMP PRB: CPT

## 2021-03-14 PROCEDURE — 81001 URINALYSIS AUTO W/SCOPE: CPT

## 2021-03-14 PROCEDURE — 36415 COLL VENOUS BLD VENIPUNCTURE: CPT

## 2021-03-14 PROCEDURE — 96372 THER/PROPH/DIAG INJ SC/IM: CPT

## 2021-03-14 PROCEDURE — 87040 BLOOD CULTURE FOR BACTERIA: CPT

## 2021-03-14 PROCEDURE — 96366 THER/PROPH/DIAG IV INF ADDON: CPT

## 2021-03-14 PROCEDURE — 96376 TX/PRO/DX INJ SAME DRUG ADON: CPT

## 2021-03-14 PROCEDURE — 96361 HYDRATE IV INFUSION ADD-ON: CPT

## 2021-03-14 PROCEDURE — 96375 TX/PRO/DX INJ NEW DRUG ADDON: CPT

## 2021-03-14 RX ADMIN — ENOXAPARIN SODIUM SCH MG: 40 INJECTION SUBCUTANEOUS at 23:03

## 2021-03-14 RX ADMIN — HYDROMORPHONE HYDROCHLORIDE PRN MG: 1 INJECTION, SOLUTION INTRAMUSCULAR; INTRAVENOUS; SUBCUTANEOUS at 22:36

## 2021-03-14 RX ADMIN — CEFAZOLIN SCH MLS/HR: 330 INJECTION, POWDER, FOR SOLUTION INTRAMUSCULAR; INTRAVENOUS at 23:03

## 2021-03-14 NOTE — CT
EXAMINATION TYPE: CT abdomen pelvis w con

 

DATE OF EXAM: 3/14/2021

 

COMPARISON: March 2, 2021

 

HISTORY: Pelvic pain, history of diverticulitis.

 

CT DLP: 1441 mGycm

Automated exposure control for dose reduction was used.

 

CONTRAST: 

Performed with IV Contrast, patient injected with 100 mL of Isovue 300.

 

Images obtained from the diaphragm to the floor the pelvis with IV contrast.

 

The lung bases are clear. There is no pleural effusion. Heart size is normal. There is no pericardial
 effusion.

 

Liver spleen stomach pancreas gallbladder appear normal. The bile ducts are not dilated.

 

There is no adrenal mass. There are small bilateral renal cortical cysts that measure up to 1 cm. The
re is no hydronephrosis. There is no evidence of a solid renal mass. Delayed images show normal renal
 excretion. There is calyceal diverticulum posterior right kidney. There is no retroperitoneal adenop
athy. Appendix is normal in size. There is no sign of appendicitis. Bladder distends smoothly. Prosta
te measures 5 cm. There is no inguinal hernia. 

 

There is mild fat stranding and wall thickening of the mid sigmoid colon. There are numerous divertic
sherry. There are some small anterior extraluminal air bubbles.

 

Lumbar vertebra have normal alignment. Disc spaces are fairly normal. There is no compression fractur
e. The bony pelvis is intact. Hip joints are intact.

 

IMPRESSION:

There is some focal diverticulitis of the mid sigmoid colon which is new compared to recent exam.

## 2021-03-14 NOTE — ED
Abdominal Pain HPI





- General


Chief Complaint: Abdominal Pain


Stated Complaint: Abdominal Pain


Time Seen by Provider: 03/14/21 20:03


Source: patient, RN notes reviewed


Mode of arrival: ambulatory


Limitations: no limitations





- History of Present Illness


Initial Comments: 





Patient is a 60-year-old male that presents emergency department complaining of 

lower abdominal pain.  He does have a history of diverticulitis and this feels a

flareup to him.  At that he was eating dinner today and then immediately fell To

the bathroom and was diarrhea.  He noted there is no blood in his diarrhea.  He 

noted the pain is about a 8-9 out of 10 with no relief from any pain medication 

at home.  He denied any chest pain shortness of breath headache nausea vomiting 

obstipation fever fatigue chills.  She does have a history of kidney tumor that 

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 

other providers for these issues.





- Related Data


                                Home Medications











 Medication  Instructions  Recorded  Confirmed


 


Benazepril/Hydrochlorothiazide 1 tab PO DAILY 04/24/15 03/14/21





[Lotensin Hct 20-12.5 mg Tablet]   


 


amLODIPine [Norvasc] 5 mg PO DAILY 03/14/21 03/14/21











                                    Allergies











Allergy/AdvReac Type Severity Reaction Status Date / Time


 


No Known Allergies Allergy   Verified 03/14/21 21:47














Review of Systems


ROS Statement: 


Those systems with pertinent positive or pertinent negative responses have been 

documented in the HPI.





ROS Other: All systems not noted in ROS Statement are negative.





Past Medical History


Past Medical History: Hypertension


Additional Past Medical History / Comment(s): Diverticulitis.  renal cx.


History of Any Multi-Drug Resistant Organisms: None Reported


Past Surgical History: No Surgical Hx Reported


Additional Past Surgical History / Comment(s): SINUS SURG, renal surgery


Past Anesthesia/Blood Transfusion Reactions: Motion Sickness, Postoperative 

Nausea & Vomiting (PONV)


Past Psychological History: No Psychological Hx Reported


Smoking Status: Former smoker


Past Alcohol Use History: Occasional


Past Drug Use History: None Reported





- Past Family History


  ** Mother


Family Medical History: No Reported History





General Exam


Limitations: no limitations


General appearance: alert, in no apparent distress


Head exam: Present: atraumatic, normocephalic, normal inspection


Eye exam: Present: normal appearance, PERRL, EOMI.  Absent: scleral icterus, 

conjunctival injection, periorbital swelling


ENT exam: Present: normal exam, mucous membranes moist


Neck exam: Present: normal inspection.  Absent: tenderness, meningismus, 

lymphadenopathy


Respiratory exam: Present: normal lung sounds bilaterally.  Absent: respiratory 

distress, wheezes, rales, rhonchi, stridor


Cardiovascular Exam: Present: regular rate, normal rhythm, normal heart sounds. 

 Absent: systolic murmur, diastolic murmur, rubs, gallop, clicks


GI/Abdominal exam: Present: soft, tenderness (Generalized lower abdominal area),

 normal bowel sounds.  Absent: distended, guarding, rebound, rigid


Extremities exam: Present: normal inspection, full ROM, normal capillary refill.

  Absent: tenderness, pedal edema, joint swelling, calf tenderness


Neurological exam: Present: alert, oriented X3, CN II-XII intact


Psychiatric exam: Present: normal affect, normal mood


Skin exam: Present: warm, dry, intact, normal color.  Absent: rash





Course


                                   Vital Signs











  03/14/21 03/14/21 03/14/21





  19:55 20:46 21:10


 


Temperature 98.6 F 98.7 F 


 


Pulse Rate 84  88


 


Respiratory 20  20





Rate   


 


Blood Pressure 161/89  150/80


 


O2 Sat by Pulse 96  98





Oximetry   














Medical Decision Making





- Medical Decision Making





60-year-old male complaining of lower abdominal pain.


Labs, CT of the abdomen and pelvis, 1 L normal saline, 4 mg of morphine ordered.


CT of the head and pelvis showed acute diverticulitis.


Elevated white count at 20.1.


Case discussed with Dr. Guevara, patient will be admitted to inpatient at the 

hospital to Dr. Aranda





- Lab Data


Result diagrams: 


                                 03/14/21 20:12





                                 03/14/21 20:12


                                   Lab Results











  03/14/21 03/14/21 03/14/21 Range/Units





  20:12 20:12 20:12 


 


WBC  20.1 H    (3.8-10.6)  k/uL


 


RBC  5.37    (4.30-5.90)  m/uL


 


Hgb  16.2    (13.0-17.5)  gm/dL


 


Hct  48.4    (39.0-53.0)  %


 


MCV  90.3    (80.0-100.0)  fL


 


MCH  30.2    (25.0-35.0)  pg


 


MCHC  33.4    (31.0-37.0)  g/dL


 


RDW  13.1    (11.5-15.5)  %


 


Plt Count  299    (150-450)  k/uL


 


MPV  7.8    


 


Neutrophils %  83    %


 


Lymphocytes %  9    %


 


Monocytes %  4    %


 


Eosinophils %  3    %


 


Basophils %  0    %


 


Neutrophils #  16.8 H    (1.3-7.7)  k/uL


 


Lymphocytes #  1.8    (1.0-4.8)  k/uL


 


Monocytes #  0.8    (0-1.0)  k/uL


 


Eosinophils #  0.6    (0-0.7)  k/uL


 


Basophils #  0.0    (0-0.2)  k/uL


 


Sodium    137  (137-145)  mmol/L


 


Potassium    4.1  (3.5-5.1)  mmol/L


 


Chloride    103  ()  mmol/L


 


Carbon Dioxide    24  (22-30)  mmol/L


 


Anion Gap    10  mmol/L


 


BUN    21 H  (9-20)  mg/dL


 


Creatinine    1.15  (0.66-1.25)  mg/dL


 


Est GFR (CKD-EPI)AfAm    80  (>60 ml/min/1.73 sqM)  


 


Est GFR (CKD-EPI)NonAf    69  (>60 ml/min/1.73 sqM)  


 


Glucose    121 H  (74-99)  mg/dL


 


Calcium    9.8  (8.4-10.2)  mg/dL


 


Total Bilirubin    0.6  (0.2-1.3)  mg/dL


 


AST    37  (17-59)  U/L


 


ALT    38  (4-49)  U/L


 


Alkaline Phosphatase    65  ()  U/L


 


Total Protein    8.0  (6.3-8.2)  g/dL


 


Albumin    4.7  (3.5-5.0)  g/dL


 


Amylase    64  ()  U/L


 


Lipase    224  ()  U/L


 


Urine Color   Yellow   


 


Urine Appearance   Clear   (Clear)  


 


Urine pH   5.5   (5.0-8.0)  


 


Ur Specific Gravity   1.023   (1.001-1.035)  


 


Urine Protein   Negative   (Negative)  


 


Urine Glucose (UA)   Negative   (Negative)  


 


Urine Ketones   Negative   (Negative)  


 


Urine Blood   Small H   (Negative)  


 


Urine Nitrite   Negative   (Negative)  


 


Urine Bilirubin   Negative   (Negative)  


 


Urine Urobilinogen   <2.0   (<2.0)  mg/dL


 


Ur Leukocyte Esterase   Negative   (Negative)  


 


Urine RBC   5   (0-5)  /hpf


 


Urine WBC   2   (0-5)  /hpf


 


Amorphous Sediment   Rare H   (None)  /hpf


 


Urine Mucus   Rare H   (None)  /hpf














- Radiology Data


Radiology results: report reviewed, image reviewed


CT of the abdomen and pelvis.  There is some focal diverticulitis of the sigmoid

 colon which is new compared to recent exam.





Disposition


Clinical Impression: 


 Acute diverticulitis, Abdominal pain, Diarrhea





Disposition: ADMITTED AS IP TO THIS [facility]


Condition: Stable


Is patient prescribed a controlled substance at d/c from ED?: No


Referrals: 


Stan Hinojosa MD [Primary Care Provider] - 1-2 days


Time of Disposition: 22:04

## 2021-03-15 RX ADMIN — HYDROMORPHONE HYDROCHLORIDE PRN MG: 1 INJECTION, SOLUTION INTRAMUSCULAR; INTRAVENOUS; SUBCUTANEOUS at 06:17

## 2021-03-15 RX ADMIN — HYDROMORPHONE HYDROCHLORIDE PRN MG: 1 INJECTION, SOLUTION INTRAMUSCULAR; INTRAVENOUS; SUBCUTANEOUS at 09:02

## 2021-03-15 RX ADMIN — HYDROMORPHONE HYDROCHLORIDE PRN MG: 1 INJECTION, SOLUTION INTRAMUSCULAR; INTRAVENOUS; SUBCUTANEOUS at 02:01

## 2021-03-15 RX ADMIN — HYDROMORPHONE HYDROCHLORIDE PRN MG: 1 INJECTION, SOLUTION INTRAMUSCULAR; INTRAVENOUS; SUBCUTANEOUS at 12:53

## 2021-03-15 RX ADMIN — CEFAZOLIN SCH MLS/HR: 330 INJECTION, POWDER, FOR SOLUTION INTRAMUSCULAR; INTRAVENOUS at 15:27

## 2021-03-15 RX ADMIN — ENOXAPARIN SODIUM SCH MG: 40 INJECTION SUBCUTANEOUS at 08:34

## 2021-03-15 RX ADMIN — PIPERACILLIN AND TAZOBACTAM SCH MLS/HR: 3; .375 INJECTION, POWDER, FOR SOLUTION INTRAVENOUS at 06:17

## 2021-03-15 RX ADMIN — PIPERACILLIN AND TAZOBACTAM SCH MLS/HR: 3; .375 INJECTION, POWDER, FOR SOLUTION INTRAVENOUS at 12:52

## 2021-03-15 RX ADMIN — PIPERACILLIN AND TAZOBACTAM SCH MLS/HR: 3; .375 INJECTION, POWDER, FOR SOLUTION INTRAVENOUS at 22:07

## 2021-03-16 LAB
BASOPHILS # BLD AUTO: 0 K/UL (ref 0–0.2)
BASOPHILS NFR BLD AUTO: 0 %
EOSINOPHIL # BLD AUTO: 0.2 K/UL (ref 0–0.7)
EOSINOPHIL NFR BLD AUTO: 1 %
ERYTHROCYTE [DISTWIDTH] IN BLOOD BY AUTOMATED COUNT: 4.58 M/UL (ref 4.3–5.9)
ERYTHROCYTE [DISTWIDTH] IN BLOOD: 12.7 % (ref 11.5–15.5)
HCT VFR BLD AUTO: 41.9 % (ref 39–53)
HGB BLD-MCNC: 14.3 GM/DL (ref 13–17.5)
LYMPHOCYTES # SPEC AUTO: 1.8 K/UL (ref 1–4.8)
LYMPHOCYTES NFR SPEC AUTO: 13 %
MCH RBC QN AUTO: 31.3 PG (ref 25–35)
MCHC RBC AUTO-ENTMCNC: 34.1 G/DL (ref 31–37)
MCV RBC AUTO: 91.6 FL (ref 80–100)
MONOCYTES # BLD AUTO: 0.5 K/UL (ref 0–1)
MONOCYTES NFR BLD AUTO: 4 %
NEUTROPHILS # BLD AUTO: 11.2 K/UL (ref 1.3–7.7)
NEUTROPHILS NFR BLD AUTO: 81 %
PLATELET # BLD AUTO: 238 K/UL (ref 150–450)
WBC # BLD AUTO: 13.8 K/UL (ref 3.8–10.6)

## 2021-03-16 RX ADMIN — ENOXAPARIN SODIUM SCH MG: 40 INJECTION SUBCUTANEOUS at 09:01

## 2021-03-16 RX ADMIN — PIPERACILLIN AND TAZOBACTAM SCH MLS/HR: 3; .375 INJECTION, POWDER, FOR SOLUTION INTRAVENOUS at 14:16

## 2021-03-16 RX ADMIN — PIPERACILLIN AND TAZOBACTAM SCH MLS/HR: 3; .375 INJECTION, POWDER, FOR SOLUTION INTRAVENOUS at 22:16

## 2021-03-16 RX ADMIN — CEFAZOLIN SCH MLS/HR: 330 INJECTION, POWDER, FOR SOLUTION INTRAMUSCULAR; INTRAVENOUS at 05:30

## 2021-03-16 RX ADMIN — CEFAZOLIN SCH MLS/HR: 330 INJECTION, POWDER, FOR SOLUTION INTRAMUSCULAR; INTRAVENOUS at 18:28

## 2021-03-16 RX ADMIN — PIPERACILLIN AND TAZOBACTAM SCH MLS/HR: 3; .375 INJECTION, POWDER, FOR SOLUTION INTRAVENOUS at 05:29

## 2021-03-16 NOTE — P.PN
Progress Note - Text


Progress Note Date: 03/16/21





Chief Complaint: Abdominal pain





History of presenting complaint:


This is a pleasant 60-year-old patient of Dr. Hinojosa.  Chronic stable medical 

conditions include hypertension, patient had 1 prior episode of diverticulitis 

about 3 years ago.  Patient started having left lower quadrant abdominal pain 

yesterday.  Became worse.  Had's couple of loose stools.  No nausea vomiting or 

fevers no chills.  Acid symptoms became more prominent decided to come to the 

ER.  Computed tomography scan did confirm focal Dr. Chand.  Was put on IV 

fluids IV antibiotics and admitted for the same.  with some decrease of appetite

since episode


Admitted with mid sigmoid diverticulitis with possible microperforation.  

Started IV Zosyn.  On clear liquids.


Today-abdominal pain is better.  Had 2 or 3 loose stools.  No nausea vomiting.  

No fever.  Has been out of bed.





Review of systems: Was done for constitutional, cardiovascular, GI, pulmonary. 

relevant finding as above





Active Medications





Acetaminophen (Acetaminophen Tab 500 Mg Tab)  500 mg PO Q4HR PRN


   PRN Reason: Fever and/ or Pain


Al Hydroxide/Mg Hydroxide (Mag Hydrox/Al Hydrox/Simeth 30 Ml Cup)  15 ml PO Q6HR

PRN


   PRN Reason: Indigestion


Alprazolam (Alprazolam 0.25 Mg Tab)  0.25 mg PO Q6HR PRN


   PRN Reason: Anxiety


Amlodipine Besylate (Amlodipine 5 Mg Tab)  5 mg PO DAILY [facility]


   Last Admin: 03/16/21 09:01 Dose:  5 mg


   Documented by: 


Calcium Carbonate/Glycine (Calcium Carbonate 500 Mg Chewable)  1,000 mg PO Q4HR 

PRN


   PRN Reason: Dyspepsia


Enoxaparin Sodium (Enoxaparin 40 Mg/0.4 Ml Syringe)  40 mg SQ DAILY [facility]


   Last Admin: 03/16/21 09:01 Dose:  40 mg


   Documented by: 


Hydromorphone HCl (Hydromorphone 1 Mg/Ml 1 Ml Syringe)  1 mg IVP Q3HR PRN


   PRN Reason: Severe Pain


   Last Admin: 03/15/21 12:53 Dose:  1 mg


   Documented by: 


Piperacillin Sod/Tazobactam (Sod 3.375 gm/ Sodium Chloride)  100 mls @ 25 mls/hr

IVPB Q8H [facility]


   Last Admin: 03/16/21 14:16 Dose:  25 mls/hr


   Documented by: 


Sodium Chloride (Saline 0.9%)  1,000 mls @ 75 mls/hr IV .[id number] [facility]


   Last Admin: 03/16/21 18:28 Dose:  75 mls/hr


   Documented by: 


Lisinopril (Lisinopril 20 Mg Tab)  20 mg PO DAILY [facility]


   Last Admin: 03/16/21 09:01 Dose:  20 mg


   Documented by: 


Melatonin (Melatonin 3 Mg Tablet)  3 mg PO HS PRN


   PRN Reason: Insomnia


Naloxone HCl (Naloxone 0.4 Mg/Ml 1 Ml Vial)  0.2 mg IV Q2M PRN


   PRN Reason: Opioid Reversal


Ondansetron HCl (Ondansetron 4 Mg/2 Ml Vial)  4 mg IVP Q8HR PRN


   PRN Reason: Nausea And Vomiting











Past medical history to include:


Hypertension, diverticulitis





Social history:


.  .  Alcohol occasionally.  Quit smoking 6 is ago.





Family history:


Reviewed, noncontributory to presentation





Physical examination:


VITAL SIGNS: 98.8, 78, 18, 1 3581, 95% room air


GENERAL: BMI 32.5, reclining in bed, looking better


EYES: Pupils equal.  Conjunctiva normal.


HEENT: External appearance of nose and ears normal, oral cavity grossly normal.


NECK: JVD not raised; masses not palpable.


HEART: First and second heart sounds are normal;  no edema.  


LUNGS: Respiratory rate normal; clear to auscultation.  


ABDOMEN: Soft, decreased left lower quadrant tenderness, no guarding rigidity, 

liver spleen not palpable, no masses palpable.  


PSYCH: Alert and oriented x3;  mood  and affect normal.  








INVESTIGATIONS, reviewed in the clinical context:


March 16: WBC 13.8 hemoglobin 14.3


WBC 20.1 hemoglobin 16.2 platelets 299 potassium 4.1 creatinine 1.15


Colon minus [PCR]-not detected


Computed tomography scan of the abdomen-small bilateral renal cortical cyst up 

to 1 cm.  Mild fat stranding and wall thickening of the mid sigmoid colon.  

Numerous diverticula.  There are some small anterior extraluminal air bubbles.





Assessment and plan:


-Acute mid sigmoid diverticulitis with what appears to be possible 

microperforation.  Patient be started on IV Zosyn.  IV fluids.  Improving.  

Advance to full liquid diet


-Essential hypertension.  Antihypertensive from home were resumed.


-Leukocytosis from diverticulitis


-Colonic diverticulosis


-Obesity BMI 32.5.  Follow-up with PCP for weight loss measures.


-DVT prophylaxis.  Put patient on subcu Lovenox.





Discussed with patient.  Advance to full liquids.  Increase activity.

## 2021-03-17 LAB
BASOPHILS # BLD AUTO: 0 K/UL (ref 0–0.2)
BASOPHILS NFR BLD AUTO: 0 %
EOSINOPHIL # BLD AUTO: 0.2 K/UL (ref 0–0.7)
EOSINOPHIL NFR BLD AUTO: 2 %
ERYTHROCYTE [DISTWIDTH] IN BLOOD BY AUTOMATED COUNT: 4.64 M/UL (ref 4.3–5.9)
ERYTHROCYTE [DISTWIDTH] IN BLOOD: 13 % (ref 11.5–15.5)
HCT VFR BLD AUTO: 42.4 % (ref 39–53)
HGB BLD-MCNC: 14.1 GM/DL (ref 13–17.5)
LYMPHOCYTES # SPEC AUTO: 1.6 K/UL (ref 1–4.8)
LYMPHOCYTES NFR SPEC AUTO: 14 %
MCH RBC QN AUTO: 30.3 PG (ref 25–35)
MCHC RBC AUTO-ENTMCNC: 33.2 G/DL (ref 31–37)
MCV RBC AUTO: 91.4 FL (ref 80–100)
MONOCYTES # BLD AUTO: 0.7 K/UL (ref 0–1)
MONOCYTES NFR BLD AUTO: 6 %
NEUTROPHILS # BLD AUTO: 8.7 K/UL (ref 1.3–7.7)
NEUTROPHILS NFR BLD AUTO: 77 %
PLATELET # BLD AUTO: 235 K/UL (ref 150–450)
WBC # BLD AUTO: 11.3 K/UL (ref 3.8–10.6)

## 2021-03-17 RX ADMIN — CEFAZOLIN SCH MLS/HR: 330 INJECTION, POWDER, FOR SOLUTION INTRAMUSCULAR; INTRAVENOUS at 21:23

## 2021-03-17 RX ADMIN — PIPERACILLIN AND TAZOBACTAM SCH MLS/HR: 3; .375 INJECTION, POWDER, FOR SOLUTION INTRAVENOUS at 06:53

## 2021-03-17 RX ADMIN — PIPERACILLIN AND TAZOBACTAM SCH MLS/HR: 3; .375 INJECTION, POWDER, FOR SOLUTION INTRAVENOUS at 15:11

## 2021-03-17 RX ADMIN — CEFAZOLIN SCH: 330 INJECTION, POWDER, FOR SOLUTION INTRAMUSCULAR; INTRAVENOUS at 04:44

## 2021-03-17 RX ADMIN — ENOXAPARIN SODIUM SCH MG: 40 INJECTION SUBCUTANEOUS at 09:30

## 2021-03-17 RX ADMIN — PIPERACILLIN AND TAZOBACTAM SCH MLS/HR: 3; .375 INJECTION, POWDER, FOR SOLUTION INTRAVENOUS at 21:22

## 2021-03-17 NOTE — P.GSCN
History of Present Illness


Consult date: 03/17/21


History of present illness: 








CHIEF COMPLAINT: Diverticulitis





HISTORY OF PRESENT ILLNESS: The patient is a 60 year old male who comes in with 

aching and crampy left lower quadrant abdominal pain for more than two days. He 

had previous attack of diverticulitis three years ago with incidental finding of

renal cell carcinoma found on CT scan per his recollection. He had surgery for 

his renal cell cancer robotically then he developed an adrenal tumor along both 

adrenal glands. He reports his crampy aching abdominal pain has improved. 

General surgery is consulted for acute diverticulitis. 





PAST MEDICAL HISTORY: 


See list and reviewed





PAST SURGICAL HISTORY: 


See list and reviewed





MEDICATIONS: 


See list and reviewed





ALLERGIES: 


See list and reviewed





SOCIAL HISTORY: See list and reviewed





FAMILY HISTORY:  See list and reviewed





REVIEW OF ORGAN SYSTEMS:


CONSTITUTIONAL:  No fevers or chills. No recent weight loss.


EYES: Denies any trouble with vision. No glasses.


HEENT:  No difficulties with hearing. No nosebleeds.  No difficulty swallowing. 


RESPIRATORY:  Denies pneumonia. Denies any troubles with breathing or dyspnea on

exertion. 


CARDIOVASCULAR:  Denies any chest pain, palpitations, or recent heart attacks. 

Has hypertensive heart disease.


GASTROINTESTINAL: Denies fatty food intolerance.  Has change in bowel habits and

gas bloat.  


GENITOURINARY:  Denies any blood in urine. Has increased urinary frequency. Past

renal cell cancer. 


NEUROLOGICAL:  Denies any numbness or tingling along the distal extremities. No 

seizure disorders or headaches.


MUSCULOSKELETAL:  Has back pain, stiffness or joint arthritis. 


SKIN: No current skin cancer. No rash.


PSYCHIATRIC:  Denies current depression or suicidal thoughts.


ENDOCRINE:  Denies current thyroid disorders. Denies any blood sugar glucose 

intolerance.


HEME/LYMPHATIC: Denies any lumps and bumps around the neck. No recent deep 

venous thrombosis.


ALLERGY/IMMUNOLOGY:  No immunoglobulin therapy. No immune deficiencies.


BREAST: Denies current breast lumps, pain or nipple discharge.





PHYSICAL EXAM:


VITALS: Reviewed


CONSTITUTIONAL:  Well developed and in no acute distress. 


EYES:  Conjuctivae without sclera icterus. Extraocular movements grossly intact.




HEAD, EARS, NOSE, THROAT: Moist buccal mucosa. Head is atraumatic, 

normocephalic. Hears conversational speech. No nasal drainage. 


NECK:  Supple. No JV distention. No thyroidomegaly.


RESPIRATORY:  Non-labored respirations and equal bilateral excursions. No gross 

wheezes. 


CARDIOVASCULAR:  Regular rate and rhythm.  Palpable 2+ radial pulses.


ABDOMEN:  Soft. No peritonitis. Tender along left lower abdomen.


MUSCULOSKELETAL:   Nail and fingers with good capillary refill.


SKIN:  Warm and well perfused with good skin turgor.


NEUROLOGIC: Cranial nerves II through XII grossly intact. Sensation upper and 

extremities intact. No focal or lateralizing signs. 


PSYCH:  Appropriate affect.  Alert and oriented to person, place and time. 

Displays appropriate insight.





CLINCAL LABS: Reviewed. WBC on admission over 21,000 now over 11,000. 

Coronavirus negative. 





IMAGING: Independently reviewed CT of the abdomen pelvis independently reviewed 

without free perforation of sigmoid diverticulitis. This is my independent 

interpretation. No small bowel obstruction.





RADIOLOGY: Report reviewed





RECORDS: Colonoscopy from 2015 reviewed with diverticulosis.  Recent CT of the 

abdomen and pelvis 3/2/2021 with enlarged prostate. Left adrenal gland tumor 2 

cm and right adrenal gland tumor 1 cm with possible metastases





ASSESSMENT: 


1.  Acute diverticulitis with left lower quadrant pain


2.  Renal cell cancer 


3.  Adrenal tumor





PLAN: 


1.  Continue conservative management with IV antibiotics.


2.  At this time, his abdominal pain has improved. No acute surgical 

intervention at this time.


3.  Management of adrenal tumor with history of renal cell cancer. 








Thank you for this kind consultation.





Past Medical History


Past Medical History: Hypertension


Additional Past Medical History / Comment(s): Diverticulitis.  renal cx.


History of Any Multi-Drug Resistant Organisms: None Reported


Past Surgical History: No Surgical Hx Reported


Additional Past Surgical History / Comment(s): SINUS SURG, renal surgery


Past Anesthesia/Blood Transfusion Reactions: Motion Sickness, Postoperative 

Nausea & Vomiting (PONV)


Past Psychological History: No Psychological Hx Reported


Smoking Status: Former smoker


Past Alcohol Use History: Occasional


Additional Past Alcohol Use History / Comment(s): QUIT SMOKING 6 YEARS AGO.


Past Drug Use History: None Reported





- Past Family History


  ** Mother


Family Medical History: No Reported History





Medications and Allergies


                                Home Medications











 Medication  Instructions  Recorded  Confirmed  Type


 


Benazepril/Hydrochlorothiazide 1 tab PO DAILY 04/24/15 03/14/21 History





[Lotensin Hct 20-12.5 mg Tablet]    


 


amLODIPine [Norvasc] 5 mg PO DAILY 03/14/21 03/14/21 History








                                    Allergies











Allergy/AdvReac Type Severity Reaction Status Date / Time


 


No Known Allergies Allergy   Verified 03/14/21 21:47














Surgical - Exam


                                   Vital Signs











Temp Pulse Resp BP Pulse Ox


 


 98.6 F   84   20   161/89   96 


 


 03/14/21 19:55  03/14/21 19:55  03/14/21 19:55  03/14/21 19:55  03/14/21 19:55














Results





- Labs





                                 03/17/21 05:22





                                 03/14/21 20:12


                  Abnormal Lab Results - Last 24 Hours (Table)











  03/17/21 Range/Units





  05:22 


 


WBC  11.3 H  (3.8-10.6)  k/uL


 


Neutrophils #  8.7 H  (1.3-7.7)  k/uL








                      Microbiology - Last 24 Hours (Table)











 03/14/21 22:21 Blood Culture - Preliminary





 Blood    No Growth after 48 hours


 


 03/14/21 22:15 Blood Culture - Preliminary





 Blood    No Growth after 48 hours














Assessment and Plan


(1) Renal cell cancer


Current Visit: Yes   Status: Acute   Code(s): C64.9 - MALIGNANT NEOPLASM OF UNSP

 KIDNEY, EXCEPT RENAL PELVIS   SNOMED Code(s): 635557762


   





(2) Left lower quadrant abdominal pain


Current Visit: Yes   Status: Acute   Code(s): R10.32 - LEFT LOWER QUADRANT PAIN 

  SNOMED Code(s): 227378576


   





(3) Adrenal tumor


Current Visit: Yes   Status: Acute   Code(s): D49.7 - NEOPLM OF UNSP BEHAV OF 

ENDO GLANDS AND OTH PRT NERVOUS SYS   SNOMED Code(s): 986451113


   





(4) Leukocytosis


Current Visit: Yes   Status: Acute   Code(s): D72.829 - ELEVATED WHITE BLOOD 

CELL COUNT, UNSPECIFIED   SNOMED Code(s): 175808075


   





(5) Acute diverticulitis


Current Visit: Yes   Status: Acute   Code(s): K57.92 - DVTRCLI OF INTEST, PART 

UNSP, W/O PERF OR ABSCESS W/O BLEED   SNOMED Code(s): 173623272

## 2021-03-17 NOTE — P.PN
Progress Note - Text


Progress Note Date: 03/17/21





Chief Complaint: Abdominal pain





History of presenting complaint:


This is a pleasant 60-year-old patient of Dr. Hinojosa.  Chronic stable medical 

conditions include hypertension, patient had 1 prior episode of diverticulitis 

about 3 years ago.  Patient started having left lower quadrant abdominal pain 

yesterday.  Became worse.  Had's couple of loose stools.  No nausea vomiting or 

fevers no chills.  Acid symptoms became more prominent decided to come to the 

ER.  Computed tomography scan did confirm focal Dr. Chand.  Was put on IV 

fluids IV antibiotics and admitted for the same.  with some decrease of appetite

since episode


Admitted with mid sigmoid diverticulitis with possible microperforation.  

Started IV Zosyn.  On clear liquids.


Today-abdominal pain much improved.  Tolerating full liquid diet.  Up to the 

bathroom.  Had loose stools.  No fever





Review of systems: Was done for constitutional, cardiovascular, GI, pulmonary. 

relevant finding as above





Active Medications





Acetaminophen (Acetaminophen Tab 500 Mg Tab)  500 mg PO Q4HR PRN


   PRN Reason: Fever and/ or Pain


Al Hydroxide/Mg Hydroxide (Mag Hydrox/Al Hydrox/Simeth 30 Ml Cup)  15 ml PO Q6HR

PRN


   PRN Reason: Indigestion


Alprazolam (Alprazolam 0.25 Mg Tab)  0.25 mg PO Q6HR PRN


   PRN Reason: Anxiety


Amlodipine Besylate (Amlodipine 5 Mg Tab)  5 mg PO DAILY [facility]


   Last Admin: 03/17/21 09:30 Dose:  5 mg


   Documented by: 


Calcium Carbonate/Glycine (Calcium Carbonate 500 Mg Chewable)  1,000 mg PO Q4HR 

PRN


   PRN Reason: Dyspepsia


Enoxaparin Sodium (Enoxaparin 40 Mg/0.4 Ml Syringe)  40 mg SQ DAILY [facility]


   Last Admin: 03/17/21 09:30 Dose:  40 mg


   Documented by: 


Hydromorphone HCl (Hydromorphone 1 Mg/Ml 1 Ml Syringe)  1 mg IVP Q3HR PRN


   PRN Reason: Severe Pain


   Last Admin: 03/15/21 12:53 Dose:  1 mg


   Documented by: 


Piperacillin Sod/Tazobactam (Sod 3.375 gm/ Sodium Chloride)  100 mls @ 25 mls/hr

IVPB Q8H [facility]


   Last Admin: 03/17/21 21:22 Dose:  25 mls/hr


   Documented by: 


Sodium Chloride (Saline 0.9%)  1,000 mls @ 75 mls/hr IV .[id number] [facility]


   Last Admin: 03/17/21 21:23 Dose:  75 mls/hr


   Documented by: 


Lisinopril (Lisinopril 20 Mg Tab)  20 mg PO DAILY [facility]


   Last Admin: 03/17/21 09:30 Dose:  20 mg


   Documented by: 


Melatonin (Melatonin 3 Mg Tablet)  3 mg PO HS PRN


   PRN Reason: Insomnia


   Last Admin: 03/16/21 20:27 Dose:  3 mg


   Documented by: 


Naloxone HCl (Naloxone 0.4 Mg/Ml 1 Ml Vial)  0.2 mg IV Q2M PRN


   PRN Reason: Opioid Reversal


Ondansetron HCl (Ondansetron 4 Mg/2 Ml Vial)  4 mg IVP Q8HR PRN


   PRN Reason: Nausea And Vomiting











Past medical history to include:


Hypertension, diverticulitis





Social history:


.  .  Alcohol occasionally.  Quit smoking 6 is ago.





Family history:


Reviewed, noncontributory to presentation





Physical examination:


VITAL SIGNS: 98.3, 66, 17, 127/76, 96% room air


GENERAL: BMI 32.5, sitting up, looking much improved


EYES: Pupils equal.  Conjunctiva normal.


HEENT: External appearance of nose and ears normal, oral cavity grossly normal.


NECK: JVD not raised; masses not palpable.


HEART: First and second heart sounds are normal;  no edema.  


LUNGS: Respiratory rate normal; clear to auscultation.  


ABDOMEN: Soft, minimal left lower quadrant tenderness, no guarding rigidity, 

liver spleen not palpable, no masses palpable.  


PSYCH: Alert and oriented x3;  mood  and affect normal.  








INVESTIGATIONS, reviewed in the clinical context:


March 17: WBC 11.3 hemoglobin 14.1


March 16: WBC 13.8 hemoglobin 14.3


WBC 20.1 hemoglobin 16.2 platelets 299 potassium 4.1 creatinine 1.15


Colon minus [PCR]-not detected


Computed tomography scan of the abdomen-small bilateral renal cortical cyst up 

to 1 cm.  Mild fat stranding and wall thickening of the mid sigmoid colon.  

Numerous diverticula.  There are some small anterior extraluminal air bubbles.





Assessment and plan:


-Acute mid sigmoid diverticulitis with what appears to be possible 

microperforation.  Patient be started on IV Zosyn.  IV fluids.  We'll advance to

 soft bland diet


-Essential hypertension.  Antihypertensive from home were resumed.


-Leukocytosis from diverticulitis


-Colonic diverticulosis


-Obesity BMI 32.5.  Follow-up with PCP for weight loss measures.


-DVT prophylaxis.  Put patient on subcu Lovenox.





Discussed with the patient.  Much better.  We'll get a surgical consult so they 

can follow-up upon discharge.  Hopefully discharge in 24 hours

## 2021-03-18 VITALS
TEMPERATURE: 97.9 F | DIASTOLIC BLOOD PRESSURE: 87 MMHG | SYSTOLIC BLOOD PRESSURE: 143 MMHG | RESPIRATION RATE: 14 BRPM | HEART RATE: 64 BPM

## 2021-03-18 RX ADMIN — CEFAZOLIN SCH: 330 INJECTION, POWDER, FOR SOLUTION INTRAMUSCULAR; INTRAVENOUS at 08:05

## 2021-03-18 RX ADMIN — ENOXAPARIN SODIUM SCH MG: 40 INJECTION SUBCUTANEOUS at 08:02

## 2021-03-18 RX ADMIN — PIPERACILLIN AND TAZOBACTAM SCH MLS/HR: 3; .375 INJECTION, POWDER, FOR SOLUTION INTRAVENOUS at 06:13

## 2021-03-19 NOTE — P.DS
Providers


Date of admission: 


03/14/21 22:27





Expected date of discharge: 03/18/21


Attending physician: 


Robbie Aranda





Consults: 





                                        





03/17/21 10:42


Consult Physician Routine 


   Consulting Provider: Rose Flores


   Consult Reason/Comments: diverticulitis


   Do you want consulting provider notified?: Yes











[facility] 


[facility]: 





Chief Complaint: Abdominal pain





History of presenting complaint:


This is a pleasant 60-year-old patient of Dr. Hinojosa.  Chronic stable medical 

conditions include hypertension, patient had 1 prior episode of diverticulitis 

about 3 years ago.  Patient started having left lower quadrant abdominal pain 

yesterday.  Became worse.  Had's couple of loose stools.  No nausea vomiting or 

fevers no chills.  Acid symptoms became more prominent decided to come to the 

ER.  Computed tomography scan did confirm focal Dr. Chand.  Was put on IV 

fluids IV antibiotics and admitted for the same.  with some decrease of appetite

since episode


Admitted with mid sigmoid diverticulitis with possible microperforation.  

Started IV Zosyn.  On clear liquids.


Today-abdominal pain resolved.  Tolerating diet.  Seen by Dr. Boyle.  

Cleared for discharge.  We will follow up with surgery.  Soft bland diet.





Consultation:


Dr. Boyle from general surgery











Past medical history to include:


Hypertension, diverticulitis





Social history:


.  .  Alcohol occasionally.  Quit smoking 6 is ago.





Family history:


Reviewed, noncontributory to presentation





Physical examination:


VITAL SIGNS: 98.9, 64, 14, 100 4397, 99% on room air


NECK: JVD not raised; masses not palpable.


HEART: First and second heart sounds are normal;  no edema.  


LUNGS: Respiratory rate normal; clear to auscultation.  


ABDOMEN: Soft, no tenderness, no guarding rigidity, liver spleen not palpable, 

no masses palpable.  


PSYCH: Alert and oriented x3;  mood  and affect normal.  








INVESTIGATIONS, reviewed in the clinical context:


March 17: WBC 11.3 hemoglobin 14.1


March 16: WBC 13.8 hemoglobin 14.3


WBC 20.1 hemoglobin 16.2 platelets 299 potassium 4.1 creatinine 1.15


Colon minus [PCR]-not detected


Computed tomography scan of the abdomen-small bilateral renal cortical cyst up 

to 1 cm.  Mild fat stranding and wall thickening of the mid sigmoid colon.  

Numerous diverticula.  There are some small anterior extraluminal air bubbles.





Assessment and plan:


-Acute mid sigmoid diverticulitis with what appears to be possible 

microperforation.  Patient be started on IV Zosyn.  DC on Augmentin


-Essential hypertension.  Antihypertensive from home were resumed.


-Leukocytosis from diverticulitis


-Colonic diverticulosis


-Obesity BMI 32.5.  Follow-up with PCP for weight loss measures.


-DVT prophylaxis.  Put patient on subcu Lovenox.











Disposition:


Home


Patient Condition at Discharge: Stable





Plan - Discharge Summary


New Discharge Prescriptions: 


New


   Amoxicillin/Potassium Clav [Augmentin 875-125 Tablet] 1 tab PO Q12HR 1 Days 

#20 tab





Continue


   Benazepril/Hydrochlorothiazide [Lotensin Hct 20-12.5 mg Tablet] 1 tab PO 

DAILY


   amLODIPine [Norvasc] 5 mg PO DAILY


Discharge Medication List





Benazepril/Hydrochlorothiazide [Lotensin Hct 20-12.5 mg Tablet] 1 tab PO DAILY 

04/24/15 [History]


amLODIPine [Norvasc] 5 mg PO DAILY 03/14/21 [History]


Amoxicillin/Potassium Clav [Augmentin 875-125 Tablet] 1 tab PO Q12HR 1 Days #20 

tab 03/18/21 [Rx]








Follow up Appointment(s)/Referral(s): 


Stan Hinojosa MD [Primary Care Provider] - 03/30/21 2:00 pm


Rose Flores MD [STAFF PHYSICIAN] - 04/06/21 1:15 pm


Activity/Diet/Wound Care/Special Instructions: 


soft bland


Discharge Disposition: HOME SELF-CARE

## 2021-04-08 ENCOUNTER — HOSPITAL ENCOUNTER (OUTPATIENT)
Dept: HOSPITAL 47 - RADCTMAIN | Age: 61
Discharge: HOME | End: 2021-04-08
Attending: FAMILY MEDICINE
Payer: COMMERCIAL

## 2021-04-08 ENCOUNTER — HOSPITAL ENCOUNTER (OUTPATIENT)
Dept: HOSPITAL 47 - LABWHC1 | Age: 61
Discharge: HOME | End: 2021-04-08
Attending: SURGERY
Payer: COMMERCIAL

## 2021-04-08 DIAGNOSIS — N32.89: ICD-10-CM

## 2021-04-08 DIAGNOSIS — K63.89: ICD-10-CM

## 2021-04-08 DIAGNOSIS — I11.9: Primary | ICD-10-CM

## 2021-04-08 DIAGNOSIS — K57.30: Primary | ICD-10-CM

## 2021-04-08 DIAGNOSIS — K57.92: ICD-10-CM

## 2021-04-08 PROCEDURE — 93005 ELECTROCARDIOGRAM TRACING: CPT

## 2021-04-08 PROCEDURE — 74176 CT ABD & PELVIS W/O CONTRAST: CPT

## 2021-04-08 PROCEDURE — 36415 COLL VENOUS BLD VENIPUNCTURE: CPT

## 2021-04-08 NOTE — CT
EXAMINATION TYPE: CT abdomen pelvis wo con

 

DATE OF EXAM: 4/8/2021

 

COMPARISON: 3/14/2021 and 3/2/2021

 

HISTORY: 61-year-old male K59.92, Follow up diverticulitis

 

CT DLP: 767.6 mGycm. Automated exposure control for dose reduction was used.

 

TECHNIQUE: Contiguous axial scanning of the abdomen and pelvis without IV contrast. Coronal and sagit
gilberto reconstructions performed. 

 

FINDINGS: 

Heart normal size without pericardial effusion. Stable strandy scarring or atelectasis inferior lingu
la. No pleural effusion.

 

Tiny hiatal hernia.

 

Noncontrast appearance of the liver, gallbladder, spleen, and pancreas show no gross abnormality. Sta
ble low density thickening of the left adrenal gland. Stable 1.3 cm nodularity of the right adrenal g
land.

 

1.1 cm posterior left renal cortical cyst is unchanged. Some post surgical change with linear high de
nsity along the posterior capsule of the mid to lower right kidney stable.

 

No dilated small bowel, free fluid, or free air. No mesenteric or retroperitoneal lymphadenopathy.

 

Normal appendix. Oral contrast has entered the colon and reached up to the splenic flexure.

 

Redemonstrated is sigmoid diverticulosis. There is persistent moderate thickening and pericolonic fat
 stranding at the level of the proximal sigmoid colon. There is linear extension of soft tissue stran
ding to the left bladder dome, axial images 70 and 71 and coronal image 40. However, no bladder wall 
thickening or intraluminal bladder air to suggest fistula at this time.

 

Bladder urine distended. Prostate gland measures 4.7 cm wide. No abnormal fluid collection in the pel
vis or pelvic lymphadenopathy.

 

Bones: Mild degenerative change in both hips. Hypertrophic facet arthropathy mid to lower lumbar spin
e.

 

 

 

 

 

 

IMPRESSION: 

1.  Sigmoid diverticulosis with findings of acute diverticulitis along the proximal sigmoid with mode
rate inflammation. This is very slightly more proximal than the diverticulitis seen on 3/14/2021. Con
 recurrent acute diverticulitis.

2.  While no abscess or free air is seen, there is some linear density extending from the inflamed se
gment of colon to the dome of the bladder. If inflammation continues, the patient may be at risk for 
development of a fistulous tract.

## 2021-04-28 ENCOUNTER — HOSPITAL ENCOUNTER (OUTPATIENT)
Dept: HOSPITAL 47 - ORWHC2ENDO | Age: 61
Discharge: HOME | End: 2021-04-28
Attending: SURGERY
Payer: COMMERCIAL

## 2021-04-28 VITALS — SYSTOLIC BLOOD PRESSURE: 151 MMHG | DIASTOLIC BLOOD PRESSURE: 84 MMHG | HEART RATE: 61 BPM

## 2021-04-28 VITALS — RESPIRATION RATE: 16 BRPM | TEMPERATURE: 98.6 F

## 2021-04-28 VITALS — BODY MASS INDEX: 31 KG/M2

## 2021-04-28 DIAGNOSIS — Z85.528: ICD-10-CM

## 2021-04-28 DIAGNOSIS — Z87.891: ICD-10-CM

## 2021-04-28 DIAGNOSIS — I10: ICD-10-CM

## 2021-04-28 DIAGNOSIS — K64.1: ICD-10-CM

## 2021-04-28 DIAGNOSIS — K63.5: ICD-10-CM

## 2021-04-28 DIAGNOSIS — Z87.19: ICD-10-CM

## 2021-04-28 DIAGNOSIS — K57.32: ICD-10-CM

## 2021-04-28 DIAGNOSIS — Z79.899: ICD-10-CM

## 2021-04-28 DIAGNOSIS — Z90.5: ICD-10-CM

## 2021-04-28 DIAGNOSIS — Z12.11: Primary | ICD-10-CM

## 2021-04-28 DIAGNOSIS — Z98.890: ICD-10-CM

## 2021-04-28 PROCEDURE — 88305 TISSUE EXAM BY PATHOLOGIST: CPT

## 2021-04-28 PROCEDURE — 45385 COLONOSCOPY W/LESION REMOVAL: CPT

## 2021-04-28 NOTE — P.PCN
Date of Procedure: 04/28/21


Description of Procedure: 























PREOPERATIVE DIAGNOSIS:


Colonoscopy screening


History of perforated diverticulitis





POSTOPERATIVE DIAGNOSIS:


Tubular adenoma ascending colon


Sigmoid diverticulitis


Internal hemorrhoids, grade 2





OPERATION:


Colonoscopy to the ileocecal valve and appendiceal orifice, cecum


Colonoscopy with hot snare polypectomy





SURGEON: Rose Flores MD.





ANESTHESIA: MAC.





INDICATIONS:


The patient is an 61-year-old male who presents for colonoscopy screening.  He 

has recent history of perforated diverticulitis.  enefits and risks were 

described and informed consent was obtained.





DESCRIPTION OF PROCEDURE:


The patient had undergone Sutab prep.  The patient had been brought into the 

operating room and laid in the left lateral decubitus position.  After adequate 

intravenous sedation, the rectum was examined with 2% lidocaine jelly. The 

prostate was unremarkable.  External hemorrhoids were encountered. The rectal 

tone was within normal limits. No lesions were palpated in the rectal vault. An 

Olympus colonoscope was advanced until the cecum, ileocecal valve and 

appendiceal orifice were clearly viewed.  The prep was excellent.  Sigmoid 

diverticulosis with focal diverticulitis between 25-30 cm from the anal verge 

was encountered.  Colonic polyps were found and removed. Focal colitis of the 

sigmoid colon was found. Retroflexion of the scope demonstrated grade 2 internal

hemorrhoids without active bleeding or inflammation. The colon was desufflated. 

The patient had tolerated the procedure well. 





Withdrawal time was over 6 minutes.





FINDINGS:


Aronchick preparation quality scale 1 (1-5)


Internal hemorrhoids, grade 2


External hemorrhoids, grade 2


No arteriovenous malformations.


Sigmoid diverticulosis with recent diverticulitis 


Removal of 2 polyp:


- Snare polypectomy 25 cm from the anal verge, 8 mm tubular adenoma with long 

stalk.


Focal colitis and sigmoid colon





RECOMMENDATIONS:


Repeat colonoscopy 3 years, 2024





Plan - Discharge Summary


Discharge Rx Participant: No


New Discharge Prescriptions: 


Continue


   Benazepril/Hydrochlorothiazide [Lotensin Hct 20-12.5 mg Tablet] 1 tab PO 

DAILY


   amLODIPine [Norvasc] 10 mg PO QAM


   Fexofenadine HCl [Allegra Allergy] 180 mg PO DAILY


   Zinc 50 mg PO DAILY


Discharge Medication List





Benazepril/Hydrochlorothiazide [Lotensin Hct 20-12.5 mg Tablet] 1 tab PO DAILY 

04/24/15 [History]


amLODIPine [Norvasc] 10 mg PO QAM 03/14/21 [History]


Fexofenadine HCl [Allegra Allergy] 180 mg PO DAILY 04/26/21 [History]


Zinc 50 mg PO DAILY 04/26/21 [History]








Follow up Appointment(s)/Referral(s): 


Rose Flores MD [STAFF PHYSICIAN] - 05/06/21


Patient Instructions/Handouts:  Colorectal Polyps (DC), Diverticulitis (DC), 

Diverticulitis Diet (GEN)


Activity/Diet/Wound Care/Special Instructions: 


Repeat colonoscopy 3 years, 2024


Discharge Disposition: HOME SELF-CARE

## 2021-04-28 NOTE — P.GSHP
History of Present Illness


H&P Date: 04/28/21














CHIEF COMPLAINT: Colon screen





HISTORY OF PRESENT ILLNESS: The patient is a 61-year-old male who


presents for colon screen.  Lower endoscopy was offered for further evaluation 

and management.





PAST MEDICAL HISTORY: 


Please see list.





PAST SURGICAL HISTORY: 


Please see list.





MEDICATIONS: 


Please see list.





ALLERGIES:  Please see list. 





SOCIAL HISTORY: No illicit drug use





FAMILY HISTORY: No reports of Crohn disease or ulcerative colitis. 





REVIEW OF ORGAN SYSTEMS: 


CONSTITUTIONAL: No reports of fevers or chills.  





PHYSICAL EXAM: 


VITAL SIGNS:  Stable


GENERAL: Well-developed pleasant in no acute distress. 


HEENT: No scleral icterus. Extraocular movements grossly


intact. Moist buccal mucosa. 


NECK: Supple without lymphadenopathy. 


CHEST: Unlabored respirations. Equal bilateral excursions. 


CARDIOVASCULAR: Regular rate and rhythm. Distal 2+ pulses. 


ABDOMEN: Soft, nontender, nondistended. 


MUSCULOSKELETAL: No clubbing, cyanosis, or edema. 





ASSESSMENT: 


1.  Colon screen.





PLAN: 


1. Recommend proceeding with a lower endoscopy





Past Medical History


Past Medical History: Hypertension


Additional Past Medical History / Comment(s): Diverticulitis.  renal CA, left 

adrenal CA


History of Any Multi-Drug Resistant Organisms: None Reported


Past Surgical History: No Surgical Hx Reported


Additional Past Surgical History / Comment(s): SINUS SURG, rt partial 

nephrectomy,left Adrenalectomy


Past Anesthesia/Blood Transfusion Reactions: Motion Sickness, Postoperative 

Nausea & Vomiting (PONV)


Smoking Status: Former smoker





- Past Family History


  ** Mother


Family Medical History: No Reported History





Medications and Allergies


                                Home Medications











 Medication  Instructions  Recorded  Confirmed  Type


 


Benazepril/Hydrochlorothiazide 1 tab PO DAILY 04/24/15 04/26/21 History





[Lotensin Hct 20-12.5 mg Tablet]    


 


amLODIPine [Norvasc] 10 mg PO QAM 03/14/21 04/26/21 History


 


Fexofenadine HCl [Allegra Allergy] 180 mg PO DAILY 04/26/21 04/26/21 History


 


Zinc 50 mg PO DAILY 04/26/21 04/26/21 History








                                    Allergies











Allergy/AdvReac Type Severity Reaction Status Date / Time


 


No Known Allergies Allergy   Verified 04/26/21 11:00

## 2021-05-14 ENCOUNTER — HOSPITAL ENCOUNTER (OUTPATIENT)
Dept: HOSPITAL 47 - LABWHC1 | Age: 61
Discharge: HOME | End: 2021-05-14
Attending: SURGERY
Payer: COMMERCIAL

## 2021-05-14 DIAGNOSIS — Z01.812: Primary | ICD-10-CM

## 2021-05-14 LAB
ALBUMIN SERPL-MCNC: 4.8 G/DL (ref 3.8–4.9)
ALBUMIN/GLOB SERPL: 1.78 G/DL (ref 1.6–3.17)
ALP SERPL-CCNC: 55 U/L (ref 41–126)
ALT SERPL-CCNC: 36 U/L (ref 10–49)
ANION GAP SERPL CALC-SCNC: 8.3 MMOL/L (ref 4–12)
AST SERPL-CCNC: 24 U/L (ref 14–35)
BUN SERPL-SCNC: 17 MG/DL (ref 9–27)
BUN/CREAT SERPL: 18.89 RATIO (ref 12–20)
CALCIUM SPEC-MCNC: 9.7 MG/DL (ref 8.7–10.3)
CHLORIDE SERPL-SCNC: 102 MMOL/L (ref 96–109)
CO2 SERPL-SCNC: 25.7 MMOL/L (ref 21.6–31.8)
ERYTHROCYTE [DISTWIDTH] IN BLOOD BY AUTOMATED COUNT: 4.98 X 10*6/UL (ref 4.4–5.6)
ERYTHROCYTE [DISTWIDTH] IN BLOOD: 12.4 % (ref 11.5–14.5)
GLOBULIN SER CALC-MCNC: 2.7 G/DL (ref 1.6–3.3)
GLUCOSE SERPL-MCNC: 90 MG/DL (ref 70–110)
HCT VFR BLD AUTO: 45 % (ref 39.6–50)
HGB BLD-MCNC: 15.2 G/DL (ref 13–17)
MCH RBC QN AUTO: 30.5 PG (ref 27–32)
MCHC RBC AUTO-ENTMCNC: 33.8 G/DL (ref 32–37)
MCV RBC AUTO: 90.4 FL (ref 80–97)
PLATELET # BLD AUTO: 336 X 10*3/UL (ref 140–440)
POTASSIUM SERPL-SCNC: 4.3 MMOL/L (ref 3.5–5.5)
PROT SERPL-MCNC: 7.5 G/DL (ref 6.2–8.2)
SODIUM SERPL-SCNC: 136 MMOL/L (ref 135–145)
WBC # BLD AUTO: 6.23 X 10*3/UL (ref 4.5–10)

## 2021-05-14 PROCEDURE — 85027 COMPLETE CBC AUTOMATED: CPT

## 2021-05-14 PROCEDURE — 80053 COMPREHEN METABOLIC PANEL: CPT

## 2021-05-14 PROCEDURE — 36415 COLL VENOUS BLD VENIPUNCTURE: CPT

## 2021-05-20 ENCOUNTER — HOSPITAL ENCOUNTER (INPATIENT)
Dept: HOSPITAL 47 - 2ORMAIN | Age: 61
LOS: 2 days | Discharge: HOME | DRG: 329 | End: 2021-05-22
Attending: SURGERY | Admitting: SURGERY
Payer: COMMERCIAL

## 2021-05-20 VITALS — BODY MASS INDEX: 29.9 KG/M2

## 2021-05-20 DIAGNOSIS — K65.1: ICD-10-CM

## 2021-05-20 DIAGNOSIS — Z90.5: ICD-10-CM

## 2021-05-20 DIAGNOSIS — I11.9: ICD-10-CM

## 2021-05-20 DIAGNOSIS — Z85.528: ICD-10-CM

## 2021-05-20 DIAGNOSIS — Z20.822: ICD-10-CM

## 2021-05-20 DIAGNOSIS — Z87.891: ICD-10-CM

## 2021-05-20 DIAGNOSIS — I42.9: ICD-10-CM

## 2021-05-20 DIAGNOSIS — K66.0: ICD-10-CM

## 2021-05-20 DIAGNOSIS — D49.7: ICD-10-CM

## 2021-05-20 DIAGNOSIS — E66.09: ICD-10-CM

## 2021-05-20 DIAGNOSIS — C64.9: ICD-10-CM

## 2021-05-20 DIAGNOSIS — Z85.858: ICD-10-CM

## 2021-05-20 DIAGNOSIS — D12.8: ICD-10-CM

## 2021-05-20 DIAGNOSIS — K63.2: ICD-10-CM

## 2021-05-20 DIAGNOSIS — K57.20: Primary | ICD-10-CM

## 2021-05-20 LAB
ALBUMIN SERPL-MCNC: 4.9 G/DL (ref 3.5–5)
ALP SERPL-CCNC: 50 U/L (ref 38–126)
ALT SERPL-CCNC: 35 U/L (ref 4–49)
ANION GAP SERPL CALC-SCNC: 11 MMOL/L
AST SERPL-CCNC: 49 U/L (ref 17–59)
BASOPHILS # BLD AUTO: 0 K/UL (ref 0–0.2)
BASOPHILS NFR BLD AUTO: 1 %
BUN SERPL-SCNC: 11 MG/DL (ref 9–20)
CALCIUM SPEC-MCNC: 9.9 MG/DL (ref 8.4–10.2)
CHLORIDE SERPL-SCNC: 104 MMOL/L (ref 98–107)
CO2 SERPL-SCNC: 23 MMOL/L (ref 22–30)
EOSINOPHIL # BLD AUTO: 0.2 K/UL (ref 0–0.7)
EOSINOPHIL NFR BLD AUTO: 3 %
ERYTHROCYTE [DISTWIDTH] IN BLOOD BY AUTOMATED COUNT: 5.1 M/UL (ref 4.3–5.9)
ERYTHROCYTE [DISTWIDTH] IN BLOOD: 12.3 % (ref 11.5–15.5)
GLUCOSE SERPL-MCNC: 86 MG/DL (ref 74–99)
HCT VFR BLD AUTO: 45 % (ref 39–53)
HGB BLD-MCNC: 16.1 GM/DL (ref 13–17.5)
LYMPHOCYTES # SPEC AUTO: 2.2 K/UL (ref 1–4.8)
LYMPHOCYTES NFR SPEC AUTO: 31 %
MCH RBC QN AUTO: 31.6 PG (ref 25–35)
MCHC RBC AUTO-ENTMCNC: 35.8 G/DL (ref 31–37)
MCV RBC AUTO: 88.2 FL (ref 80–100)
MONOCYTES # BLD AUTO: 0.6 K/UL (ref 0–1)
MONOCYTES NFR BLD AUTO: 9 %
NEUTROPHILS # BLD AUTO: 4 K/UL (ref 1.3–7.7)
NEUTROPHILS NFR BLD AUTO: 56 %
PLATELET # BLD AUTO: 286 K/UL (ref 150–450)
POTASSIUM SERPL-SCNC: 4.6 MMOL/L (ref 3.5–5.1)
PROT SERPL-MCNC: 7.9 G/DL (ref 6.3–8.2)
SODIUM SERPL-SCNC: 138 MMOL/L (ref 137–145)
WBC # BLD AUTO: 7.2 K/UL (ref 3.8–10.6)

## 2021-05-20 PROCEDURE — 86850 RBC ANTIBODY SCREEN: CPT

## 2021-05-20 PROCEDURE — 0W9J4ZZ DRAINAGE OF PELVIC CAVITY, PERCUTANEOUS ENDOSCOPIC APPROACH: ICD-10-PCS

## 2021-05-20 PROCEDURE — 87635 SARS-COV-2 COVID-19 AMP PRB: CPT

## 2021-05-20 PROCEDURE — 86900 BLOOD TYPING SEROLOGIC ABO: CPT

## 2021-05-20 PROCEDURE — 0DNN4ZZ RELEASE SIGMOID COLON, PERCUTANEOUS ENDOSCOPIC APPROACH: ICD-10-PCS

## 2021-05-20 PROCEDURE — 0DTN0ZZ RESECTION OF SIGMOID COLON, OPEN APPROACH: ICD-10-PCS

## 2021-05-20 PROCEDURE — 64999 UNLISTED PX NERVOUS SYSTEM: CPT

## 2021-05-20 PROCEDURE — 80048 BASIC METABOLIC PNL TOTAL CA: CPT

## 2021-05-20 PROCEDURE — 88307 TISSUE EXAM BY PATHOLOGIST: CPT

## 2021-05-20 PROCEDURE — 86901 BLOOD TYPING SEROLOGIC RH(D): CPT

## 2021-05-20 PROCEDURE — 85025 COMPLETE CBC W/AUTO DIFF WBC: CPT

## 2021-05-20 PROCEDURE — 0DJD8ZZ INSPECTION OF LOWER INTESTINAL TRACT, VIA NATURAL OR ARTIFICIAL OPENING ENDOSCOPIC: ICD-10-PCS

## 2021-05-20 PROCEDURE — 8E0W4CZ ROBOTIC ASSISTED PROCEDURE OF TRUNK REGION, PERCUTANEOUS ENDOSCOPIC APPROACH: ICD-10-PCS

## 2021-05-20 PROCEDURE — 80053 COMPREHEN METABOLIC PANEL: CPT

## 2021-05-20 RX ADMIN — KETOROLAC TROMETHAMINE SCH MG: 15 INJECTION, SOLUTION INTRAMUSCULAR; INTRAVENOUS at 18:11

## 2021-05-20 RX ADMIN — POTASSIUM CHLORIDE SCH MLS: 14.9 INJECTION, SOLUTION INTRAVENOUS at 10:19

## 2021-05-20 RX ADMIN — KETOROLAC TROMETHAMINE SCH MG: 15 INJECTION, SOLUTION INTRAMUSCULAR; INTRAVENOUS at 23:09

## 2021-05-20 RX ADMIN — SIMETHICONE SCH: 20 SUSPENSION/ DROPS ORAL at 21:03

## 2021-05-20 RX ADMIN — HEPARIN SODIUM SCH UNIT: 5000 INJECTION INTRAVENOUS; SUBCUTANEOUS at 20:50

## 2021-05-20 RX ADMIN — ONDANSETRON SCH: 2 INJECTION INTRAMUSCULAR; INTRAVENOUS at 19:51

## 2021-05-20 RX ADMIN — POTASSIUM CHLORIDE SCH MLS: 14.9 INJECTION, SOLUTION INTRAVENOUS at 12:15

## 2021-05-20 RX ADMIN — ONDANSETRON SCH MG: 2 INJECTION INTRAMUSCULAR; INTRAVENOUS at 23:12

## 2021-05-20 RX ADMIN — SIMETHICONE SCH MG: 20 SUSPENSION/ DROPS ORAL at 20:50

## 2021-05-20 RX ADMIN — CEFAZOLIN SCH MLS/HR: 330 INJECTION, POWDER, FOR SOLUTION INTRAMUSCULAR; INTRAVENOUS at 20:57

## 2021-05-20 NOTE — P.ANPRN
Procedure Note - Anesthesia





- Nerve Block Performed


  ** Bilateral Erector Spinae Single


Time Out Performed: Yes


Date of Procedure: 05/20/21


Procedure Start Time: 10:43


Procedure Stop Time: 10:55


Location of Patient: PreOp


Indication: Requested by Surgeon


Specifically requested for management of pain by DrBriseyda: Rose Flores


Sedation Type: Sedate with meaningful contact maintained


Preparation: Sterile Prep


Position: Prone


Needle Types: Pajunk


Needle Gauge: 21


Ultrasound used to visualize needle placement: Yes


Ultrasound used to observe medication spread: Yes


Injectate: 0.5% Ropivacaine (see comment for volume) (15 ml plus 0.9% NS 15 ml 

per side)


Blood Aspirated: No


Pain Paresthesia on Injection Noted: No


Resistance on Injection: Normal


Image Stored and Saved: Yes


Events: Uneventful and Well Tolerated

## 2021-05-20 NOTE — P.GSHP
History of Present Illness


H&P Date: 05/20/21











CHIEF COMPLAINT: History of sigmoid diverticulitis





HISTORY OF PRESENT ILLNESS: The patient is a 46-year-old male with recurrent 

sigmoid diverticulitis.  Reports intermittent left lower quadrant abdominal 

pain.  Last hospitalization was in 3 months ago.  He also has history of renal 

for cancer.  He completed a colonoscopy which excluded underlying neoplasm.  Now

he presents for sigmoid colon resection.





PAST MEDICAL HISTORY: 


Please see list.





PAST SURGICAL HISTORY: 


Please see list.





MEDICATIONS: 


Please see list.





ALLERGIES:  Please see list. 





SOCIAL HISTORY: No illicit drug use





FAMILY HISTORY: No reports of Crohn disease or ulcerative colitis. 





REVIEW OF ORGAN SYSTEMS: 


CONSTITUTIONAL:  Denies any fever or chills.  Obesity with BMI 30.9.


HEENT:  Denies any trouble with vision or nosebleeds.  No difficulty swallowing.




LYMPHATIC:  The patient denies any lumps and bumps around the neck. 


ENDOCRINE:  Denies any thyroid disorders.  Denies blood sugar glucose 

intolerance.


RESPIRATORY:  Denies pneumonia. Denies any troubles with breathing or dyspnea on

exertion. 


CARDIOVASCULAR:  Denies any chest pain, palpitations, or recent heart attacks.  

Has hypertensive heart disease.


GASTROINTESTINAL:  Has chronic diverticulitis.  Recent perforated diverticulitis

with recurrence.


GENITOURINARY:  Renal cell cancer


MUSCULOSKELETAL:   Has back pain, stiffness, joint arthritis. 


NEUROLOGIC:  Denies any numbness or tingling along the distal extremities. No 

seizure disorders or headaches.


PSYCHIATRIC:  Denies depression or suidical ideation. 


HEMATOLOGIC:  Denies any abnormal bleeding or bruising.       





PHYSICAL EXAM: 


VITAL SIGNS:  Stable


GENERAL: Well-developed pleasant in no acute distress. 


HEENT: No scleral icterus. Extraocular movements grossly intact. Moist buccal 

mucosa.  


NECK: Supple without lymphadenopathy. 


CHEST: Unlabored respirations. Equal bilateral excursions. 


CARDIOVASCULAR: Regular rate and rhythm. Distal 2+ pulses. 


ABDOMEN: Soft, nontender, nondistended. 


MUSCULOSKELETAL: No clubbing, cyanosis, or edema. 


NERUO: Cranial nerves 2-12 grossly intact.


PSYCH: Alert and oriented to person place and time.





ASSESSMENT: 


1.  History of perforated diverticulitis


2.  Renal cell cancer


3.  Hypertensive heart disease


4.  Obesity due to excess calories, BMI 30.9





PLAN: 


1.  Benefits and risks of surgical robotic sigmoid resection was reviewed in 

detail.  Robotic-assisted approach was also described.


2.  Enhanced colon recovery program.


3.  DVT prophylaxis.


4.  Antibiotic prophylaxis.


5.  Inpatient hospitalization greater than 2 nights.


6.  Patient is elevated risk for complications due to recent diverticulitis 

including renal cell cancer





Past Medical History


Past Medical History: Hypertension


Additional Past Medical History / Comment(s): Diverticulitis.rt renal CA,lt 

adrenal CA


History of Any Multi-Drug Resistant Organisms: None Reported


Past Surgical History: No Surgical Hx Reported


Additional Past Surgical History / Comment(s): SINUS SURG, rt partial 

nephrectomy,left adrenalectomy


Past Anesthesia/Blood Transfusion Reactions: Motion Sickness, Postoperative 

Nausea & Vomiting (PONV)


Additional Past Anesthesia/Blood Transfusion Reaction / Comment(s): no hx blood 

transfusion


Smoking Status: Former smoker





- Past Family History


  ** Mother


Family Medical History: No Reported History





Medications and Allergies


                                Home Medications











 Medication  Instructions  Recorded  Confirmed  Type


 


Benazepril/Hydrochlorothiazide 1 tab PO QAM 04/24/15 05/14/21 History





[Lotensin Hct 20-12.5 mg Tablet]    


 


amLODIPine [Norvasc] 10 mg PO QAM 03/14/21 05/14/21 History


 


Fexofenadine HCl [Allegra Allergy] 180 mg PO DAILY 04/26/21 05/14/21 History


 


Zinc 50 mg PO DAILY 04/26/21 05/14/21 History








                                    Allergies











Allergy/AdvReac Type Severity Reaction Status Date / Time


 


No Known Allergies Allergy   Verified 05/14/21 09:34

## 2021-05-20 NOTE — P.OP
Date of Procedure: 05/20/21


Description of Procedure: 





SURGEON:  DENA HILL MD





PREOPERATIVE DIAGNOSES:


1.  Recurrent sigmoid diverticulitis


2.  History of renal cell cancer status post nephrectomy


3.  Hypertensive heart disease with cardiomyopathy


4.  History of adrenal cancer





POSTOPERATIVE DIAGNOSES:


1.  Complicated sigmoid diverticulitis with abdominal wall fistula and pelvic 

abscess


2.  History of renal cell cancer status post nephrectomy


3.  Hypertensive heart disease with cardiomyopathy


4.  History of adrenal cancer


5.  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


6.  Rectal adenoma





OPERATION:


1.  Robotic-assisted daVinci Xi laparoscopic with sigmoid colectomy/low anterior

resection using 29mm EEA Ethicon powered stapler 


2.  Robotic-assisted daVinci Xi laparoscopic lysis of adhesions over 1.5 hours


3.  Drainage of intrapelvic diverticular abscess, left pelvis 2 cm


4.  Takedown of colonic fistula to abdominal wall/left pelvis


5.  Intraoperative colonoscopy for flexible sigmoidoscopy





Anesthesia: GETA, local, regional 


Estimated Blood Loss (ml): 20


Pathology: other (Sigmoid colon, anastomosis)


Condition: stable


Disposition: floor





COMPLICATIONS: None.





Operative Findings: 


1.  Contained left pelvic abscess from complicated diverticulitis, 2 cm drain


2.  Complicated sigmoid diverticulitis with fistula to left abdominal wall 

resected


3.  Rectal adenoma identified via colonoscopy


4.  Intense phlegmon of left pelvis with extensive lysis of adhesions over 1.5 

hours performed





INDICATIONS: The patient is a 61-year-old male with recurrent sigmoid 

diverticulitis.  He has recent history of renal cell large renal cell cancer 

status post resection.  Surgical intervention was described. Benefits and risks,

including infection, bowel injury, ureteral injury, colostomy creation and 

possibility for additional surgery was discussed at length. Informed consent was

obtained. All questions of the patient and family were answered.





DESCRIPTION: Earlier the patient had undergone a bowel prep using the enhanced 

colon recovery program. The patient


was transferred to the operating room onto a split leg table and repositioned to

modified lithotomy following intubation. A Holm catheter was placed.  





The abdomen was then prepped and draped in standard sterile fashion as Ioban was

placed along the abdomen to minimize any contamination of skin floor. 





After a timeout protocol was performed, attention was then brought to the left 

upper quadrant whereby a 0 degree


5 mm laparoscopic trocar entry was performed. The abdominal cavity was entered 

and insufflated to 15 mmHg pressure, which was tolerated well.


Diagnostic laparoscopy confirmed severe adhesions omentum to abdominal wall 

involving the right upper quadrant, midline, epigastrium.





Next trocars were placed 20 cm superior from the pelvis. A 12-mm trocar was 

placed along the right lateral abdominal wall. A 8 mm port was placed along the 

right upper quadrant.  Ports were placed 10 cm apart from each other including 

15-20 cm away from the target anatomy of the left pelvis. An 8-mm port was was 

placed along the left upper quadrant.  The stapler 12-mm port was arranged along

the left lateral abdominal wall.  The patient was then placed in Trendelenburg 

position, at least 21.





The robotic da [place] XI system was primed. 





The robot was docked from the right side of the patient. 





Using atraumatic graspers and vessel sealer, the robotic system was docked and 

primed as described.  Instruments were interchanged by the assistant including 

needle , clip applier, hook cautery, robotic stapler and vessel sealer. 

The robot stapler was prepared along the right lateral abdominal wall.





Initial attention was brought to lysis of adhesions involving epigastrium more 

omental adhesions from prior nephrectomy was identified and lysed using vessel 

sealer. Next, attention was brought to sigmoid colon.  The sigmoid colon was 

densely adherent to the left pelvis requiring extensive lysis of adhesions over 

1.5 hours which features of sigmoid colon fistula to the left abdominal wall as 

well as 2 cm intrapelvic abscess also drained.  A 60 mm black staple load was 

used to divide the colocutaneous fistula to the abdominal wall.  The sigmoid 

colon was released from surrounding tissue.





The sigmoid mesentery was mobilized using a vessel sealer.  Using robot stapler 

60 mm black loads, the descending colon was divided.  Mobilization of the colon 

was performed to the pelvic brim along the sacral promontory.  The mesentery of 

the sigmoid colon was mobilized towards the descending colon using a vessel 

sealer.  Next, the top of the rectum was divided using robotic stapler 60 mm 

black loads.  The rest of the sigmoid colon mesentery was mobilized using vessel

sealer.  Additionally, the sigmoid colon was mobilized onto the colon to 

minimize injury to the ureters.





I went to the foot of the bed for selection of a sizer.  A colorectal colon 

anastomosis with an EEA 29-mm was selected after using a sizer along the rectum.

 For the proximal sigmoid colon, a 29-mm anvil was placed after creating a 

colotomy then closed using a stapler at the distal end.  The robot arms were 

temporarily undocked.  A stapler was entered along the rectum and mated to the 

anvil for 1 minute.  The anastomosis was created.  The donuts were thick on the 

rectum side and then on the colon side.  I then performed a bedside flexible 

sigmoidoscopy using colonoscope where no leaks or defects were confirmed as the 

assistant placed normal saline along the pelvis.  A rectal adenoma 1 cm was 

identified however not resected as snare was not available.  I re-scrubbed into 

the case.  Irrigation fluid was suctioned from the abdomen.  





The robot was undocked. 





All needles were removed from the abdominal cavity. 





Via the left lateral 12-mm trocar site, the resected colon was retrieved using 

15 mm Endo Catch bag after widening the skin incision to 4-cm. The fascial 

defect was oversewn using 0 Vicryl and a Sameer [place].  All incisions were 

cleansed using dilute normal saline and hydrogen peroxide mixture.





Next all pneumoperitoneum was evacuated from the abdominal cavity. 





The 8-mm trocar sites were reapproximated using 4-0 Monocryl in an interrupted 

subcuticular fashion.  Local anesthetic was infiltrated to all wounds for postop

analgesia.  All incisions were also cleansed with diluted hydrogen peroxide.  An

Optifoam surgical dressing was placed over the colon extraction site.





Exofin was applied to the rest of the skin incisions. The patient was extubated 

successfully. 





The patient was transferred to the postanesthesia care unit in stable condition.

## 2021-05-21 LAB
ANION GAP SERPL CALC-SCNC: 5.4 MMOL/L (ref 4–12)
BASOPHILS # BLD AUTO: 0 K/UL (ref 0–0.2)
BASOPHILS NFR BLD AUTO: 0 %
BUN SERPL-SCNC: 9 MG/DL (ref 9–27)
BUN/CREAT SERPL: 11.25 RATIO (ref 12–20)
CALCIUM SPEC-MCNC: 8.8 MG/DL (ref 8.7–10.3)
CHLORIDE SERPL-SCNC: 109 MMOL/L (ref 96–109)
CO2 SERPL-SCNC: 25.6 MMOL/L (ref 21.6–31.8)
EOSINOPHIL # BLD AUTO: 0.1 K/UL (ref 0–0.7)
EOSINOPHIL NFR BLD AUTO: 1 %
ERYTHROCYTE [DISTWIDTH] IN BLOOD BY AUTOMATED COUNT: 4.39 M/UL (ref 4.3–5.9)
ERYTHROCYTE [DISTWIDTH] IN BLOOD: 12.5 % (ref 11.5–15.5)
GLUCOSE SERPL-MCNC: 118 MG/DL (ref 70–110)
HCT VFR BLD AUTO: 39.6 % (ref 39–53)
HGB BLD-MCNC: 13.7 GM/DL (ref 13–17.5)
LYMPHOCYTES # SPEC AUTO: 1.8 K/UL (ref 1–4.8)
LYMPHOCYTES NFR SPEC AUTO: 14 %
MCH RBC QN AUTO: 31.2 PG (ref 25–35)
MCHC RBC AUTO-ENTMCNC: 34.5 G/DL (ref 31–37)
MCV RBC AUTO: 90.3 FL (ref 80–100)
MONOCYTES # BLD AUTO: 0.8 K/UL (ref 0–1)
MONOCYTES NFR BLD AUTO: 7 %
NEUTROPHILS # BLD AUTO: 9.9 K/UL (ref 1.3–7.7)
NEUTROPHILS NFR BLD AUTO: 78 %
PLATELET # BLD AUTO: 246 K/UL (ref 150–450)
POTASSIUM SERPL-SCNC: 4.1 MMOL/L (ref 3.5–5.5)
SODIUM SERPL-SCNC: 140 MMOL/L (ref 135–145)
WBC # BLD AUTO: 12.8 K/UL (ref 3.8–10.6)

## 2021-05-21 RX ADMIN — HYDROMORPHONE HYDROCHLORIDE PRN MG: 1 INJECTION, SOLUTION INTRAMUSCULAR; INTRAVENOUS; SUBCUTANEOUS at 05:56

## 2021-05-21 RX ADMIN — CEFAZOLIN SCH MLS/HR: 330 INJECTION, POWDER, FOR SOLUTION INTRAMUSCULAR; INTRAVENOUS at 11:36

## 2021-05-21 RX ADMIN — LORATADINE SCH MG: 10 TABLET ORAL at 07:55

## 2021-05-21 RX ADMIN — ONDANSETRON SCH MG: 2 INJECTION INTRAMUSCULAR; INTRAVENOUS at 17:47

## 2021-05-21 RX ADMIN — CEFAZOLIN SCH: 330 INJECTION, POWDER, FOR SOLUTION INTRAMUSCULAR; INTRAVENOUS at 09:23

## 2021-05-21 RX ADMIN — ONDANSETRON SCH: 2 INJECTION INTRAMUSCULAR; INTRAVENOUS at 11:37

## 2021-05-21 RX ADMIN — CEFAZOLIN SCH MLS/HR: 330 INJECTION, POWDER, FOR SOLUTION INTRAMUSCULAR; INTRAVENOUS at 21:36

## 2021-05-21 RX ADMIN — METRONIDAZOLE SCH MLS/HR: 500 INJECTION, SOLUTION INTRAVENOUS at 17:55

## 2021-05-21 RX ADMIN — ONDANSETRON SCH: 2 INJECTION INTRAMUSCULAR; INTRAVENOUS at 16:55

## 2021-05-21 RX ADMIN — SIMETHICONE SCH MG: 20 SUSPENSION/ DROPS ORAL at 21:43

## 2021-05-21 RX ADMIN — CEFAZOLIN SCH MLS/HR: 330 INJECTION, POWDER, FOR SOLUTION INTRAMUSCULAR; INTRAVENOUS at 03:00

## 2021-05-21 RX ADMIN — HYDROMORPHONE HYDROCHLORIDE PRN MG: 1 INJECTION, SOLUTION INTRAMUSCULAR; INTRAVENOUS; SUBCUTANEOUS at 16:10

## 2021-05-21 RX ADMIN — KETOROLAC TROMETHAMINE SCH MG: 15 INJECTION, SOLUTION INTRAMUSCULAR; INTRAVENOUS at 11:36

## 2021-05-21 RX ADMIN — SIMETHICONE SCH MG: 20 SUSPENSION/ DROPS ORAL at 14:21

## 2021-05-21 RX ADMIN — SIMETHICONE SCH MG: 20 SUSPENSION/ DROPS ORAL at 07:56

## 2021-05-21 RX ADMIN — ALVIMOPAN SCH MG: 12 CAPSULE ORAL at 21:43

## 2021-05-21 RX ADMIN — HEPARIN SODIUM SCH UNIT: 5000 INJECTION INTRAVENOUS; SUBCUTANEOUS at 21:43

## 2021-05-21 RX ADMIN — ONDANSETRON SCH MG: 2 INJECTION INTRAMUSCULAR; INTRAVENOUS at 05:07

## 2021-05-21 RX ADMIN — HEPARIN SODIUM SCH UNIT: 5000 INJECTION INTRAVENOUS; SUBCUTANEOUS at 07:55

## 2021-05-21 RX ADMIN — LISINOPRIL AND HYDROCHLOROTHIAZIDE SCH EACH: 12.5; 2 TABLET ORAL at 07:55

## 2021-05-21 RX ADMIN — ALVIMOPAN SCH MG: 12 CAPSULE ORAL at 07:56

## 2021-05-21 RX ADMIN — SIMETHICONE SCH MG: 20 SUSPENSION/ DROPS ORAL at 17:51

## 2021-05-21 RX ADMIN — KETOROLAC TROMETHAMINE SCH MG: 15 INJECTION, SOLUTION INTRAMUSCULAR; INTRAVENOUS at 17:52

## 2021-05-21 RX ADMIN — KETOROLAC TROMETHAMINE SCH MG: 15 INJECTION, SOLUTION INTRAMUSCULAR; INTRAVENOUS at 05:04

## 2021-05-21 RX ADMIN — METRONIDAZOLE SCH MLS/HR: 500 INJECTION, SOLUTION INTRAVENOUS at 11:36

## 2021-05-21 NOTE — P.PN
Subjective


Progress Note Date: 05/21/21





Patient seen and evaluated.  He is happy.  We'll pain is well-controlled.  No 

flatus or bowel movement.  Patient encouraged to walk including chew gum to 

promote flatulence.  Otherwise continue hospitalization.





Objective





- Vital Signs


Vital signs: 


                                   Vital Signs











Temp  97.5 F L  05/21/21 11:45


 


Pulse  73   05/21/21 11:45


 


Resp  20   05/21/21 11:45


 


BP  147/76   05/21/21 11:45


 


Pulse Ox  90 L  05/21/21 11:45








                                 Intake & Output











 05/20/21 05/21/21 05/21/21





 18:59 06:59 18:59


 


Intake Total 4450  


 


Output Total 730 1860 


 


Balance 3720 -1860 


 


Weight 91.8 kg  


 


Intake:   


 


  IV 4450  


 


Output:   


 


  Urine 710 1860 


 


  Estimated Blood Loss 20  


 


Other:   


 


  Voiding Method  Indwelling Catheter 














- Labs


CBC & Chem 7: 


                                 05/21/21 05:11





                                 05/21/21 05:11


Labs: 


                  Abnormal Lab Results - Last 24 Hours (Table)











  05/21/21 05/21/21 Range/Units





  05:11 05:11 


 


WBC  12.8 H   (3.8-10.6)  k/uL


 


Neutrophils #  9.9 H   (1.3-7.7)  k/uL


 


BUN/Creatinine Ratio   11.25 L  (12.00-20.00)  Ratio


 


Glucose   118 H  ()  mg/dL

## 2021-05-22 VITALS
HEART RATE: 57 BPM | DIASTOLIC BLOOD PRESSURE: 76 MMHG | RESPIRATION RATE: 18 BRPM | SYSTOLIC BLOOD PRESSURE: 123 MMHG | TEMPERATURE: 98.9 F

## 2021-05-22 RX ADMIN — ALVIMOPAN SCH MG: 12 CAPSULE ORAL at 08:31

## 2021-05-22 RX ADMIN — ONDANSETRON SCH MG: 2 INJECTION INTRAMUSCULAR; INTRAVENOUS at 00:19

## 2021-05-22 RX ADMIN — POTASSIUM CHLORIDE SCH: 14.9 INJECTION, SOLUTION INTRAVENOUS at 10:38

## 2021-05-22 RX ADMIN — CEFAZOLIN SCH MLS/HR: 330 INJECTION, POWDER, FOR SOLUTION INTRAMUSCULAR; INTRAVENOUS at 06:11

## 2021-05-22 RX ADMIN — LORATADINE SCH MG: 10 TABLET ORAL at 08:31

## 2021-05-22 RX ADMIN — METRONIDAZOLE SCH MLS/HR: 500 INJECTION, SOLUTION INTRAVENOUS at 00:14

## 2021-05-22 RX ADMIN — KETOROLAC TROMETHAMINE SCH MG: 15 INJECTION, SOLUTION INTRAMUSCULAR; INTRAVENOUS at 00:16

## 2021-05-22 RX ADMIN — SIMETHICONE SCH: 20 SUSPENSION/ DROPS ORAL at 08:31

## 2021-05-22 RX ADMIN — METRONIDAZOLE SCH MLS/HR: 500 INJECTION, SOLUTION INTRAVENOUS at 06:09

## 2021-05-22 RX ADMIN — METRONIDAZOLE SCH MLS/HR: 500 INJECTION, SOLUTION INTRAVENOUS at 10:59

## 2021-05-22 RX ADMIN — SIMETHICONE SCH: 20 SUSPENSION/ DROPS ORAL at 11:01

## 2021-05-22 RX ADMIN — ONDANSETRON SCH MG: 2 INJECTION INTRAMUSCULAR; INTRAVENOUS at 05:36

## 2021-05-22 RX ADMIN — HEPARIN SODIUM SCH UNIT: 5000 INJECTION INTRAVENOUS; SUBCUTANEOUS at 08:30

## 2021-05-22 RX ADMIN — LISINOPRIL AND HYDROCHLOROTHIAZIDE SCH EACH: 12.5; 2 TABLET ORAL at 08:31

## 2021-05-22 RX ADMIN — KETOROLAC TROMETHAMINE SCH MG: 15 INJECTION, SOLUTION INTRAMUSCULAR; INTRAVENOUS at 10:59

## 2021-05-22 RX ADMIN — ONDANSETRON SCH MG: 2 INJECTION INTRAMUSCULAR; INTRAVENOUS at 11:00

## 2021-05-22 RX ADMIN — KETOROLAC TROMETHAMINE SCH MG: 15 INJECTION, SOLUTION INTRAMUSCULAR; INTRAVENOUS at 05:34

## 2021-05-22 NOTE — P.PN
Subjective


Progress Note Date: 05/22/21














CHIEF COMPLAINT: Diverticulitis with abscess and fistula





HISTORY OF PRESENT ILLNESS: The patient is a 61-year-old male status post low 

anterior resection 5/20. He is passing flatus. Pain is controlled. He is 

tolerating liquid diet. 





ROS: No reports of nausea and vomiting.  No bowel movements.  No fevers or 

chills.  No new chest pain.  No productive sputum





PHYSICAL EXAM: 


VITAL SIGNS: Reviewed


CONSTITUTIONAL:  Well developed and in no acute distress. 


EYES:  Conjuctivae without sclera icterus. Extraocular movements grossly intact.




HEAD, EARS, NOSE, THROAT: Moist buccal mucosa. Head is atraumatic, 

normocephalic. Hears conversational speech. No nasal drainage.


NECK:  Supple. No thyroidomegaly.


RESPIRATORY:  Non-labored respirations and equal bilateral excursions.


CARDIOVASCULAR:  Palpable 2+ radial pulses.  


ABDOMEN: Incision intact


MUSCULOSKELETAL:  No gross deformity of the lower extremities noted.  No 

clubbing.  No cyanosis.


SKIN: Good skin turgor. Well perfused. 


NEUROLOGIC: Cranial nerves II through XII grossly intact.  No focal or 

lateralizing signs. 


PSYCH:  Appropriate affect.  Alert and oriented to person, place and time. 





ASSESSMENT: 


1. Diverticulitis





PLAN: 


1.  Advance to low fiber diet


2.  Discharge diet diverticulitis without seeds described. 


3.  Discharge once tolerating low fiber diet. 





Objective





- Vital Signs


Vital signs: 


                                   Vital Signs











Temp  98.6 F   05/22/21 05:00


 


Pulse  62   05/22/21 05:00


 


Resp  20   05/22/21 05:00


 


BP  147/78   05/22/21 05:00


 


Pulse Ox  95   05/22/21 05:00








                                 Intake & Output











 05/21/21 05/22/21 05/22/21





 18:59 06:59 18:59


 


Intake Total  500 


 


Balance  500 


 


Intake:   


 


  Oral  500 


 


Other:   


 


  Voiding Method  Toilet 














- Labs


CBC & Chem 7: 


                                 05/21/21 05:11





                                 05/21/21 05:11

## 2021-05-24 NOTE — P.DS
Providers


Date of admission: 


05/20/21 09:18





Expected date of discharge: 05/22/21


Attending physician: 


Rose Flores





Consults: 





                                        





05/20/21 07:58


Consult Physician Routine 


   Consulting Provider: [facility]


   Consult Reason/Comments: Regional block


   Do you want consulting provider notified?: Yes











Primary care physician: 


Seferino Hinojosa








- Discharge Diagnosis(es)


(1) Acute diverticulitis


Status: Acute   





(2) Adrenal tumor


Status: Acute   





(3) Left lower quadrant abdominal pain


Status: Acute   





(4) Renal cell cancer


Status: Acute   


Hospital Course: 











CHIEF COMPLAINT: Diverticulitis with abscess and fistula





HISTORY OF PRESENT ILLNESS: The patient is a 61-year-old male status post low 

anterior resection 5/20. He is passing flatus. Pain is controlled. He is 

tolerating liquid diet. 





ROS: No reports of nausea and vomiting.  No bowel movements.  No fevers or 

chills.  No new chest pain.  No productive sputum





PHYSICAL EXAM: 


VITAL SIGNS: Reviewed


CONSTITUTIONAL:  Well developed and in no acute distress. 


EYES:  Conjuctivae without sclera icterus. Extraocular movements grossly intact.




HEAD, EARS, NOSE, THROAT: Moist buccal mucosa. Head is atraumatic, 

normocephalic. Hears conversational speech. No nasal drainage.


NECK:  Supple. No thyroidomegaly.


RESPIRATORY:  Non-labored respirations and equal bilateral excursions.


CARDIOVASCULAR:  Palpable 2+ radial pulses.  


ABDOMEN: Incision intact


MUSCULOSKELETAL:  No gross deformity of the lower extremities noted.  No 

clubbing.  No cyanosis.


SKIN: Good skin turgor. Well perfused. 


NEUROLOGIC: Cranial nerves II through XII grossly intact.  No focal or 

lateralizing signs. 


PSYCH:  Appropriate affect.  Alert and oriented to person, place and time. 





ASSESSMENT: 


1. Diverticulitis





PLAN: 


1.  Advance to low fiber diet


2.  Discharge diet diverticulitis without seeds described. 


3.  Discharge once tolerating low fiber diet. 


Patient Condition at Discharge: Good





Plan - Discharge Summary


Discharge Rx Participant: No


New Discharge Prescriptions: 


New


   Ciprofloxacin HCl 500 mg PO BID #10 tab


   Acetaminophen Tab [Tylenol Tab] 1,000 mg PO Q6HR PRN #30 tablet


     PRN Reason: Pain


   metroNIDAZOLE [Flagyl] 500 mg PO TID #15 tab


   Simethicone [Gas-X] 125 mg PO AC-TID PRN #20 capsule


     PRN Reason: Pain





Continue


   Benazepril/Hydrochlorothiazide [Lotensin Hct 20-12.5 mg Tablet] 1 tab PO QAM


   amLODIPine [Norvasc] 10 mg PO QAM


   Fexofenadine HCl [Allegra Allergy] 180 mg PO DAILY


   Zinc 50 mg PO DAILY


Discharge Medication List





Benazepril/Hydrochlorothiazide [Lotensin Hct 20-12.5 mg Tablet] 1 tab PO QAM 

04/24/15 [History]


amLODIPine [Norvasc] 10 mg PO QAM 03/14/21 [History]


Fexofenadine HCl [Allegra Allergy] 180 mg PO DAILY 04/26/21 [History]


Zinc 50 mg PO DAILY 04/26/21 [History]


Acetaminophen Tab [Tylenol Tab] 1,000 mg PO Q6HR PRN #30 tablet 05/22/21 [Rx]


Ciprofloxacin HCl 500 mg PO BID #10 tab 05/22/21 [Rx]


Simethicone [Gas-X] 125 mg PO AC-TID PRN #20 capsule 05/22/21 [Rx]


metroNIDAZOLE [Flagyl] 500 mg PO TID #15 tab 05/22/21 [Rx]








Follow up Appointment(s)/Referral(s): 


Rose Flores MD [STAFF PHYSICIAN] - 05/27/21


Patient Instructions/Handouts:  Ciprofloxacin (By mouth), Acetaminophen (By 

mouth), Simethicone (By mouth), Metronidazole (By mouth), Diverticulitis (DC), 

Diverticulitis Diet (DC)


Activity/Diet/Wound Care/Special Instructions: 


NO SEEDS. Low fiber diet. 


No lifting over 4 pounds in 4 weeks, Suzy 20


May shower


No soaking in bath tubs for 2 weeks, Suzy 3


Please notify your surgeon if you develop nausea and vomiting including new 

onset of abdominal pain.  


Please ambulate at all times.   


Use Simethicone, Gas-X, ibuprofen, Aleve Tylenol scheduled for the next 24-48 

hours for best pain relief.


Use ice along incisions for the today to prevent swelling.





Discharge Disposition: HOME SELF-CARE

## 2021-05-27 ENCOUNTER — HOSPITAL ENCOUNTER (OUTPATIENT)
Dept: HOSPITAL 47 - LABWHC1 | Age: 61
Discharge: HOME | End: 2021-05-27
Attending: SURGERY
Payer: COMMERCIAL

## 2021-05-27 DIAGNOSIS — K57.32: Primary | ICD-10-CM

## 2021-05-27 LAB
ERYTHROCYTE [DISTWIDTH] IN BLOOD BY AUTOMATED COUNT: 5.19 X 10*6/UL (ref 4.4–5.6)
ERYTHROCYTE [DISTWIDTH] IN BLOOD: 12.7 % (ref 11.5–14.5)
HCT VFR BLD AUTO: 46.3 % (ref 39.6–50)
HGB BLD-MCNC: 15.6 G/DL (ref 13–17)
MCH RBC QN AUTO: 30.1 PG (ref 27–32)
MCHC RBC AUTO-ENTMCNC: 33.7 G/DL (ref 32–37)
MCV RBC AUTO: 89.2 FL (ref 80–97)
PLATELET # BLD AUTO: 361 X 10*3/UL (ref 140–440)
WBC # BLD AUTO: 13.21 X 10*3/UL (ref 4.5–10)

## 2021-05-27 PROCEDURE — 36415 COLL VENOUS BLD VENIPUNCTURE: CPT

## 2021-05-27 PROCEDURE — 85027 COMPLETE CBC AUTOMATED: CPT

## 2021-05-27 PROCEDURE — 80053 COMPREHEN METABOLIC PANEL: CPT

## 2021-05-28 LAB
ALBUMIN SERPL-MCNC: 4.6 G/DL (ref 3.8–4.9)
ALBUMIN/GLOB SERPL: 1.7 G/DL (ref 1.6–3.17)
ALP SERPL-CCNC: 56 U/L (ref 41–126)
ALT SERPL-CCNC: 51 U/L (ref 10–49)
ANION GAP SERPL CALC-SCNC: 15.5 MMOL/L (ref 4–12)
AST SERPL-CCNC: 40 U/L (ref 14–35)
BUN SERPL-SCNC: 18 MG/DL (ref 9–27)
BUN/CREAT SERPL: 22.5 RATIO (ref 12–20)
CALCIUM SPEC-MCNC: 10 MG/DL (ref 8.7–10.3)
CHLORIDE SERPL-SCNC: 98 MMOL/L (ref 96–109)
CO2 SERPL-SCNC: 23.5 MMOL/L (ref 21.6–31.8)
GLOBULIN SER CALC-MCNC: 2.7 G/DL (ref 1.6–3.3)
GLUCOSE SERPL-MCNC: 98 MG/DL (ref 70–110)
POTASSIUM SERPL-SCNC: 4.1 MMOL/L (ref 3.5–5.5)
PROT SERPL-MCNC: 7.3 G/DL (ref 6.2–8.2)
SODIUM SERPL-SCNC: 137 MMOL/L (ref 135–145)

## 2021-06-10 ENCOUNTER — HOSPITAL ENCOUNTER (OUTPATIENT)
Dept: HOSPITAL 47 - RADUSWWP | Age: 61
Discharge: HOME | End: 2021-06-10
Attending: SURGERY
Payer: COMMERCIAL

## 2021-06-10 DIAGNOSIS — K80.20: Primary | ICD-10-CM

## 2021-06-10 DIAGNOSIS — N28.89: ICD-10-CM

## 2021-06-10 PROCEDURE — 76705 ECHO EXAM OF ABDOMEN: CPT

## 2021-06-10 NOTE — US
EXAMINATION TYPE: US gallbladder

 

DATE OF EXAM: 6/10/2021

 

COMPARISON: CT for a 21

 

CLINICAL HISTORY: K80.10. Patient has LUQ pain at incisional scar from Laparoscopy Sigmoid Colectomy 
3 weeks ago. Patient denies RUQ pain

 

EXAM MEASUREMENTS:

 

Liver Length:  16.2 cm   

Gallbladder Wall:  0.2 cm   

CBD:  0.5 cm

Right Kidney:  9.1 x 6.7 x 4.8 cm

 

 

 

Pancreas:  wnl, tail obscured by overlying bowel gas

Liver:  mildly attenuated posteriorly   

Gallbladder:  gallstone = 1.7 x 1.1 x 0.6cm noted mid to fundal lumen in supine and LLD

**Evidence for sonographic Castanon's sign:  no

CBD:  wnl 

Right Kidney:  history of partial right nephrectomy; superolateral pole mixed lesion = 1.9 x 1.6 x 1.
5cm is noted. This appears to correspond to the CT findings.

 

LUQ area scanned appears wnl at patient's post area of pain. 

 

IMPRESSION: 

1. Cholelithiasis.

2. Mass near the right kidney appears nonspecific. Monitoring either ultrasound or CT can be performe
d.

## 2021-07-06 ENCOUNTER — HOSPITAL ENCOUNTER (OUTPATIENT)
Dept: HOSPITAL 47 - RADCTMAIN | Age: 61
Discharge: HOME | End: 2021-07-06
Attending: INTERNAL MEDICINE
Payer: COMMERCIAL

## 2021-07-06 DIAGNOSIS — Z85.528: ICD-10-CM

## 2021-07-06 DIAGNOSIS — E27.8: Primary | ICD-10-CM

## 2021-07-06 LAB — BUN SERPL-SCNC: 18 MG/DL (ref 9–20)

## 2021-07-06 PROCEDURE — 74177 CT ABD & PELVIS W/CONTRAST: CPT

## 2021-07-06 PROCEDURE — 84520 ASSAY OF UREA NITROGEN: CPT

## 2021-07-06 PROCEDURE — 82565 ASSAY OF CREATININE: CPT

## 2021-07-06 PROCEDURE — 71260 CT THORAX DX C+: CPT

## 2021-07-06 PROCEDURE — 36415 COLL VENOUS BLD VENIPUNCTURE: CPT

## 2021-07-06 NOTE — CT
EXAMINATION TYPE: CT ChestAbdPelvis w con

 

DATE OF EXAM: 7/6/2021

 

COMPARISON: Prior CT March 2, 2021 and additional CTs.

 

HISTORY: Renal cell cancer

 

CT DLP: 1243.6 mGycm. Automated Exposure Control for Dose Reduction was Utilized.

 

CONTRAST: 

CT scan of the thorax, abdomen and pelvis is performed with oral and with IV Contrast, patient inject
ed with 100 mL of Isovue 300.

 

FINDINGS:

 

LUNGS: Mild linear scarring in the lingula.  No suspicious nodules or masses. There is no pleural eff
usion or pneumothorax seen.  The tracheobronchial tree is patent.

 

MEDIASTINUM: There are no greater than 1 cm hilar or mediastinal lymph nodes.   No cardiomegaly or pe
ricardial effusion is seen.  

 

LIVER/GB: Triangular shaped hyperdensity right hepatic lobe extends to surface becomes less prominent
 on delayed consistent with transient hepatic attenuation difference.

 

PANCREAS: No significant abnormality is seen.

 

SPLEEN: No significant abnormality is seen.

 

ADRENALS: Slight thickening to reason left adrenal gland not significantly changed from March 2, 2021
 CT. Stable 1.1 cm hyperdense nodule axial image 57 from most recent CT.

 

KIDNEYS: Focal defect posteriorly to lower pole level right kidney with some residual low dense tissu
e axial image 73 unchanged from most recent CT. Symmetric cortical medullary uptake and excretion wit
hout hydronephrosis seen bilaterally. There is 1.2 cm partially exophytic thin-walled cyst posteriorl
y mid pole level left kidney series 5 image 30.

 

BOWEL: Oral contrast reaches level of left colon. No suspicious small or large bowel dilatation. Sutu
res at level of sigmoid colon in the left pelvis are now present. Small-sized hiatal hernia redemonst
rated. Incidental normal contrast-filled appendix.

 

GENITAL ORGANS: Mildly enlarged prostate.

 

LYMPH NODES: No greater than 1cm abdominal or pelvic lymph nodes are appreciated.

 

OSSEOUS STRUCTURES: Slight scoliotic curvature in the thoracic spine.

 

OTHER: No significant additional abnormality is seen.

 

IMPRESSION: Stable nodularity to both adrenal glands new from original 2018 study. Posttreatment cotton
ge posteriorly mid to lower pole of the right kidney redemonstrated.  No new or enlarging masses or l
ymph nodes.

## 2021-07-19 ENCOUNTER — HOSPITAL ENCOUNTER (OUTPATIENT)
Dept: HOSPITAL 47 - OR | Age: 61
Discharge: HOME | End: 2021-07-19
Attending: SURGERY
Payer: COMMERCIAL

## 2021-07-19 VITALS — HEART RATE: 71 BPM | SYSTOLIC BLOOD PRESSURE: 158 MMHG | DIASTOLIC BLOOD PRESSURE: 82 MMHG

## 2021-07-19 VITALS — BODY MASS INDEX: 29.5 KG/M2

## 2021-07-19 VITALS — TEMPERATURE: 97.3 F

## 2021-07-19 VITALS — RESPIRATION RATE: 16 BRPM

## 2021-07-19 DIAGNOSIS — E66.09: ICD-10-CM

## 2021-07-19 DIAGNOSIS — K80.10: Primary | ICD-10-CM

## 2021-07-19 DIAGNOSIS — Z87.891: ICD-10-CM

## 2021-07-19 DIAGNOSIS — Z87.19: ICD-10-CM

## 2021-07-19 DIAGNOSIS — K66.0: ICD-10-CM

## 2021-07-19 DIAGNOSIS — I11.9: ICD-10-CM

## 2021-07-19 DIAGNOSIS — M19.90: ICD-10-CM

## 2021-07-19 LAB
ALBUMIN SERPL-MCNC: 4.7 G/DL (ref 3.5–5)
ALP SERPL-CCNC: 62 U/L (ref 38–126)
ALT SERPL-CCNC: 31 U/L (ref 4–49)
ANION GAP SERPL CALC-SCNC: 9 MMOL/L
AST SERPL-CCNC: 31 U/L (ref 17–59)
BASOPHILS # BLD AUTO: 0.1 K/UL (ref 0–0.2)
BASOPHILS NFR BLD AUTO: 1 %
BUN SERPL-SCNC: 15 MG/DL (ref 9–20)
CALCIUM SPEC-MCNC: 10 MG/DL (ref 8.4–10.2)
CHLORIDE SERPL-SCNC: 106 MMOL/L (ref 98–107)
CO2 SERPL-SCNC: 24 MMOL/L (ref 22–30)
EOSINOPHIL # BLD AUTO: 0.3 K/UL (ref 0–0.7)
EOSINOPHIL NFR BLD AUTO: 3 %
ERYTHROCYTE [DISTWIDTH] IN BLOOD BY AUTOMATED COUNT: 5.36 M/UL (ref 4.3–5.9)
ERYTHROCYTE [DISTWIDTH] IN BLOOD: 13.6 % (ref 11.5–15.5)
GLUCOSE SERPL-MCNC: 102 MG/DL (ref 74–99)
HCT VFR BLD AUTO: 48.5 % (ref 39–53)
HGB BLD-MCNC: 16.1 GM/DL (ref 13–17.5)
LYMPHOCYTES # SPEC AUTO: 3.2 K/UL (ref 1–4.8)
LYMPHOCYTES NFR SPEC AUTO: 35 %
MCH RBC QN AUTO: 30 PG (ref 25–35)
MCHC RBC AUTO-ENTMCNC: 33.1 G/DL (ref 31–37)
MCV RBC AUTO: 90.4 FL (ref 80–100)
MONOCYTES # BLD AUTO: 0.7 K/UL (ref 0–1)
MONOCYTES NFR BLD AUTO: 7 %
NEUTROPHILS # BLD AUTO: 4.6 K/UL (ref 1.3–7.7)
NEUTROPHILS NFR BLD AUTO: 51 %
PLATELET # BLD AUTO: 271 K/UL (ref 150–450)
POTASSIUM SERPL-SCNC: 4.3 MMOL/L (ref 3.5–5.1)
PROT SERPL-MCNC: 7.7 G/DL (ref 6.3–8.2)
SODIUM SERPL-SCNC: 139 MMOL/L (ref 137–145)
WBC # BLD AUTO: 9.1 K/UL (ref 3.8–10.6)

## 2021-07-19 PROCEDURE — 85025 COMPLETE CBC W/AUTO DIFF WBC: CPT

## 2021-07-19 PROCEDURE — 80053 COMPREHEN METABOLIC PANEL: CPT

## 2021-07-19 PROCEDURE — 47563 LAPARO CHOLECYSTECTOMY/GRAPH: CPT

## 2021-07-19 PROCEDURE — 88304 TISSUE EXAM BY PATHOLOGIST: CPT

## 2021-07-19 RX ADMIN — POTASSIUM CHLORIDE SCH MLS: 14.9 INJECTION, SOLUTION INTRAVENOUS at 11:10

## 2021-07-19 RX ADMIN — POTASSIUM CHLORIDE SCH MLS: 14.9 INJECTION, SOLUTION INTRAVENOUS at 14:29

## 2021-07-19 NOTE — P.OP
Date of Procedure: 07/19/21


Description of Procedure: 








SURGEON:  ROSE FLORES MD





PREOPERATIVE DIAGNOSES:  


1.  Chronic cholecystitis with cholelithiasis


2.  Hypertensive heart disease


3.  History of diverticulitis


4.  Obesity due to excess calories, BMI 30.4





POSTOPERATIVE DIAGNOSES:  


1.  Chronic cholecystitis with cholelithiasis


2.  Hypertensive heart disease


3.  History of diverticulitis


4.  Obesity due to excess calories, BMI 30.4


5.  Peritoneal adhesions, right upper quadrant





OPERATION:       


1.  Robotic-assisted da [place] Xi laparoscopic lysis  of adhesions


2.  Robotic-assisted da [place] Xi laparoscopic cholecystectomy, multiport with 

FIREFLY


 


ESTIMATED BLOOD LOSS:  5 mL.


SPECIMENS REMOVED:  Gallbladder.


COMPLICATIONS:  None.





OPERATIVE FINDINGS:  


1.  Moderate scarring over body and infundibulum of the gallbladder with 

peritoneal adhesions, pericholecystic with features of chronic cholecystitis


2.  Large 1 cm gallstones palpated.


3.  Left upper quadrant peritoneal adhesions from prior surgery identified. 





INDICATIONS: The patient is a 61-year-old male who presents with symptomatic 

gallstones.  Robotic assisted laparoscopic approach was described. Benefits and 

risks of the procedure including but not limited to bleeding, infection, injury 

to the biliary tree was described. Informed consent was obtained.  





DESCRIPTION OF PROCEDURE: Patient was brought to the operating room, 


placed in supine position. After general induction, the abdomen had 


been prepped and draped in standard sterile fashion. The robotic da [place] 


XI system was primed.  





After a timeout protocol was performed, the patient had been prepped 


and draped in standard sterile fashion.





The patient was injected with indocyanine green.





A 5 mm 0 degrees laparoscopic trocar entry was performed along the left upper 

quadrant.  The abdomen insufflated to 15 mmHg pressure which was tolerated well.

Diagnostic laparoscopy demonstrated no injury to bowel viscera or mesentery.  

The liver surface was unremarkable.  Next, two 8 mm robotic ports were placed 

along the right upper abdomen. The camera 8-mm port was maintained along the e

pigastrium.  Another 8 mm port was placed along the left upper abdominal wall 

after exchanging the 5 mm port. Please note that the ports were placed at least 

10 to 15 cm away from the target anatomy of the gallbladder. 





The robot was docked along the left lateral abdomen. 


The patient was repositioned in reverse Trendelenburg position. 





Using a grasper for arm 3, a grasper for arm 4, including hook cautery for arm 

1, the 


robotic system was docked and primed as described.  


Instruments were interchanged by the assistant including hook cautery, Bovie 

cautery and clip appliers.





I had sat at the console. 





The gallbladder was scarred with peritoneal adhesions.  Lysis of adhesions was 

performed to free the gallbladder from the surrounding tissues.  Next attention 

was brought to the infundibulum and cystic structures.  The infundibulum and 

cystic duct were dissected free from surrounding tissues.  The cystic duct was 

isolated.





FIREFLY was used to identify the cystic artery and cystic structures.





A critical view of safety was obtained.





Large PLASTIC clips were used throughout the entire case.


Using a clip applier, 2 clips were placed at the junction of the infundibulum 

and cystic duct.


The cystic duct was divided between clips. Next, the cystic artery


was similarly clipped and cauterized.





Electro-Bovie cautery was used to remove the gallbladder from the


hepatic fossa. Hemostasis was checked and found to be adequate. 





The robot was undocked.





I re-scrubbed into the case.





Using a 10 mm Endo Catch bag via the left upper quadrant incision, the 


specimen was removed from the abdominal cavity.  





All pneumoperitoneum instruments were evacuated from the abdominal 


cavity. The incisions were reapproximated using 4-0 Monocryl in an interrupted 


subcuticular fashion.  Fascial defects were less than 8 mm in size.  


Please note along the trocar sites, local anesthetic was placed as a field block




prior to insertion of all instruments.  





Liquid glue was applied to the skin.





At the end of the procedure needle, sponge, and instrument count had 


been verified correct by the surgical technician. The patient was 


transferred to postanesthesia care unit in stable condition.





Intraoperative films were shared with the patient's family.





Plan - Discharge Summary


Discharge Rx Participant: Yes


New Discharge Prescriptions: 


New


   Ibuprofen [Motrin] 600 mg PO Q8HR PRN #30 tab


     PRN Reason: Pain


   Acetaminophen Tab [Tylenol Tab] 1,000 mg PO Q6HR PRN #30 tablet


     PRN Reason: Pain


   Simethicone [Gas-X] 125 mg PO AC-TID PRN #20 capsule


     PRN Reason: Pain





Continue


   Benazepril/Hydrochlorothiazide [Lotensin Hct 20-12.5 mg Tablet] 1 tab PO QAM


   amLODIPine [Norvasc] 10 mg PO QAM


   Fexofenadine HCl [Allegra Allergy] 180 mg PO DAILY


   Zinc 50 mg PO DAILY


Discharge Medication List





Benazepril/Hydrochlorothiazide [Lotensin Hct 20-12.5 mg Tablet] 1 tab PO QAM 

04/24/15 [History]


amLODIPine [Norvasc] 10 mg PO QAM 03/14/21 [History]


Fexofenadine HCl [Allegra Allergy] 180 mg PO DAILY 04/26/21 [History]


Zinc 50 mg PO DAILY 04/26/21 [History]


Acetaminophen Tab [Tylenol Tab] 1,000 mg PO Q6HR PRN #30 tablet 07/19/21 [Rx]


Ibuprofen [Motrin] 600 mg PO Q8HR PRN #30 tab 07/19/21 [Rx]


Simethicone [Gas-X] 125 mg PO AC-TID PRN #20 capsule 07/19/21 [Rx]








Follow up Appointment(s)/Referral(s): 


Rose Flores MD [STAFF PHYSICIAN] - 07/27/21 1:15 pm


Patient Instructions/Handouts:  *[facility] - Managing Your Pain After Surgery 

Without Opioids, *[facility] - (Anesthesia) Discharge Instructions Outpatient 

Surgery, Low Fat Diet (DC), Laparoscopic Cholecystectomy (DC)


Activity/Diet/Wound Care/Special Instructions: 


Recommend low-fat diet for the next 2 days.





No lifting over 10 pounds in 2 weeks until  Aug 2nd.  May shower. No bath tub 

soaks for two weeks until Aug 2nd.





Diet as tolerated. 





Use Tylenol, simethicone and ibuprofen or Aleve scheduled for the next 24-48 

hours for best pain relief.





Use ice along incisions for today to prevent swelling.


Discharge Disposition: HOME SELF-CARE

## 2021-10-22 ENCOUNTER — HOSPITAL ENCOUNTER (OUTPATIENT)
Dept: HOSPITAL 47 - OR | Age: 61
Discharge: HOME | End: 2021-10-22
Attending: SURGERY
Payer: COMMERCIAL

## 2021-10-22 VITALS — RESPIRATION RATE: 18 BRPM | HEART RATE: 71 BPM

## 2021-10-22 VITALS — DIASTOLIC BLOOD PRESSURE: 75 MMHG | SYSTOLIC BLOOD PRESSURE: 122 MMHG

## 2021-10-22 VITALS — TEMPERATURE: 97.6 F

## 2021-10-22 VITALS — BODY MASS INDEX: 30.2 KG/M2

## 2021-10-22 DIAGNOSIS — Z85.53: ICD-10-CM

## 2021-10-22 DIAGNOSIS — I10: ICD-10-CM

## 2021-10-22 DIAGNOSIS — Z87.891: ICD-10-CM

## 2021-10-22 DIAGNOSIS — Z79.899: ICD-10-CM

## 2021-10-22 DIAGNOSIS — M19.90: ICD-10-CM

## 2021-10-22 DIAGNOSIS — L72.0: Primary | ICD-10-CM

## 2021-10-22 PROCEDURE — 13101 CMPLX RPR TRUNK 2.6-7.5 CM: CPT

## 2021-10-22 PROCEDURE — 11406 EXC TR-EXT B9+MARG >4.0 CM: CPT

## 2021-10-22 PROCEDURE — 88304 TISSUE EXAM BY PATHOLOGIST: CPT

## 2021-10-22 PROCEDURE — 88305 TISSUE EXAM BY PATHOLOGIST: CPT

## 2021-10-22 NOTE — P.OP
Date of Procedure: 10/22/21


Description of Procedure: 








SURGEON:  ROSE FLORES MD


ASSISTANT:  None.


PREOPERATIVE DIAGNOSES:  


1.  Upper midline back tumor


2.  Hypertensive heart disease 


3.  Seasonal allergies





POSTOPERATIVE DIAGNOSES:


1.  Deep subcutaneous upper midline back tumor, 6 x 3 cm


2.  Hypertensive heart disease 


3.  Seasonal allergies





PROCEDURES PERFORMED:


1. Excision of deep subcutaneous upper midline back tumor, 6 x 3 cm


2. Complex closure upper back incision, 7-cm 





Anesthesia: GETA, local


Estimated Blood Loss (ml): 5


Pathology: other (back mass)


Condition: stable


Disposition: same day





COMPLICATIONS:  None. 





Operative Findings: 


1. Excision of deep subcutaneous upper back tumor 6 x 3 cm





INDICATIONS:


The patient is a 61-year-old male who presents with symptomatic upper back 

tumor. Benefits and risks of surgical intervention were described including 

bleeding, infection, seroma, pain and recurrence. Informed consent was obtained.







DESCRIPTION OR PROCEDURE:  In the preoperative area, the area of concern was 

marked with indelible marker. Patient was brought into the operating room. After

general induction, he was positioned in right lateral decubitus position. The 

back was


prepped and draped in a standard sterile fashion with ChloraPrep. 





Timeout protocol was confirmed with the surgical team regarding the patient's 

name, procedure to be performed including preoperative medications.  DVT 

prophylaxis was confirmed. 





A field block was placed of the left lower back. An transverse incision using 

#15 blade was made along the marking into 


the dermis and subcutaneous tissue.  Electro-Bovie cautery was used to enter 

deep into the subcutaneous tissue where a firm fibrous tumor was removed in tot

al of 6 x 3 cm in a transverse elliptical fashion for complex closure.  Upper 

and lower flaps with undermining was created to allow for closure.  0 Vicryl for

the deep subcutaneous tissue followed by 3-0 Monocryl in a running subcuticular 

fashion was placed along the dermis.





The skin was cleansed and Exofin tape with liquid was applied. The incision was 

covered with Optifoam dressing.





At the end of the procedure, needle, sponge, and instrument count was verified 

correct by surgical technician. 





The patient tolerated the procedure well. 





Plan - Discharge Summary


Discharge Rx Participant: Yes


New Discharge Prescriptions: 


Continue


   Benazepril/Hydrochlorothiazide [Lotensin Hct 20-12.5 mg Tablet] 1 tab PO QAM


   amLODIPine [Norvasc] 10 mg PO QAM


   Fexofenadine HCl [Allegra Allergy] 180 mg PO DAILY


   Zinc 50 mg PO DAILY


   Inulin/Chromium Picolinate [Fiber Gummies Chew] 1 tab PO DAILY


   Vitamin D Gummie 1 tab PO DAILY


Discharge Medication List





Benazepril/Hydrochlorothiazide [Lotensin Hct 20-12.5 mg Tablet] 1 tab PO QAM 

04/24/15 [History]


amLODIPine [Norvasc] 10 mg PO QAM 03/14/21 [History]


Fexofenadine HCl [Allegra Allergy] 180 mg PO DAILY 04/26/21 [History]


Zinc 50 mg PO DAILY 04/26/21 [History]


Inulin/Chromium Picolinate [Fiber Gummies Chew] 1 tab PO DAILY 10/20/21 

[History]


Vitamin D Gummie 1 tab PO DAILY 10/20/21 [History]








Follow up Appointment(s)/Referral(s): 


Rose Flores MD [STAFF PHYSICIAN] - 10/26/21


Patient Instructions/Handouts:  Excision of Skin Lesion (DC), *[facility] - 

Managing Your Pain After Surgery Without Opioids, Skin Adhesive Care (DC)


Activity/Diet/Wound Care/Special Instructions: 


NO WIDE MOTIONS OF THE SHOULDERS/ARMS FOR 1 WEEK. 


NO BENDING AT THE HIPS WHILE SITTING


NO EXTREME STRETCHING OF THE BACK


See instructions on dressing. DO NOT REMOVE DRESSING.


No lifting over 10 pounds in 2 weeks, Nov 5


May shower. No bath tub soaks for two weeks, Nov 5


Diet as tolerated. 


Discharge Disposition: HOME SELF-CARE

## 2021-10-22 NOTE — P.GSHP
History of Present Illness


H&P Date: 10/22/21











CHIEF COMPLAINT:


Back mass





HISTORY OF PRESENT ILLNESS:


The patient is a 61 year-old male with mass along the upper back for over 2 

years. He reports discomfort and increased growth.  He presents today for 

surgical excision.





PAST MEDICAL HISTORY: 


Please see list.





PAST SURGICAL HISTORY: 


Please see list.





MEDICATIONS: 


Please see list.





ALLERGIES:  Please see list. 





SOCIAL HISTORY: Please see list. 





FAMILY HISTORY: No reports of Crohn disease or ulcerative colitis. 





REVIEW OF ORGAN SYSTEMS: 


CONSTITUTIONAL: No reports of fevers or chills. 


GI:  Denies any blood in stools or constipation. 





PHYSICAL EXAM: 


VITAL SIGNS:  Stable


Musculoskeletal: No clubbing cyanosis or edema


SKIN: Upper back lesion 3 cm


GENERAL: Well developed and in no acute distress. Pleasant.


HEENT: No sclera icterus. Extraocular movements grossly intact. Moist buccal 

mucosa. Head is atraumatic, normocephalic. Hears conversational speech. No nasal

drainage.


NECK: Supple without lymphadenopathy. No JV distention.


CHEST: Non-labored respirations and equal bilateral excursions. 


CARDIOVASCULAR: Regular rate and rhythm. Palpable 2+ radial pulses.


ABDOMEN: Soft. Non-tender. Nondistended.


NEUROLOGIC: No focal or lateralizing signs. 


PSYCH: Appropriate affect. Alert and oriented to person, place and time.








ASSESSMENT:


1.  Upper ack mass





PLAN:


1.  Will proceed of excision of subcutaneous tumor along the back.


2.  DVT prophylaxis.


3.  Antibiotic prophylaxis.


4.  Time of recovery, at least one week. 





Past Medical History


Past Medical History: Cancer, Hypertension, Osteoarthritis (OA)


Additional Past Medical History / Comment(s): hx Diverticulitis, vitiligo, hx 

renal cancer.  


History of Any Multi-Drug Resistant Organisms: None Reported


Past Surgical History: Bowel Resection, Cholecystectomy


Additional Past Surgical History / Comment(s): SINUS SURG, partial rt 

nephrectomy, left adrenal gland removed, colectomy 5/2021


Past Anesthesia/Blood Transfusion Reactions: Motion Sickness, Postoperative 

Nausea & Vomiting (PONV)


Additional Past Anesthesia/Blood Transfusion Reaction / Comment(s): no hx blood 

transfusion


Smoking Status: Former smoker





- Past Family History


  ** Mother


Family Medical History: No Reported History





Medications and Allergies


                                Home Medications











 Medication  Instructions  Recorded  Confirmed  Type


 


Benazepril/Hydrochlorothiazide 1 tab PO QAM 04/24/15 10/20/21 History





[Lotensin Hct 20-12.5 mg Tablet]    


 


amLODIPine [Norvasc] 10 mg PO QAM 03/14/21 10/20/21 History


 


Fexofenadine HCl [Allegra Allergy] 180 mg PO DAILY 04/26/21 10/20/21 History


 


Zinc 50 mg PO DAILY 04/26/21 10/20/21 History


 


Inulin/Chromium Picolinate [Fiber 1 tab PO DAILY 10/20/21 10/20/21 History





Gummies Chew]    


 


Vitamin D Gummie 1 tab PO DAILY 10/20/21 10/20/21 History








                                    Allergies











Allergy/AdvReac Type Severity Reaction Status Date / Time


 


No Known Allergies Allergy   Verified 10/20/21 11:26














Surgical - Exam


                                   Vital Signs











Temp Pulse Resp BP Pulse Ox


 


 97.2 F L  60   20   153/79   99 


 


 10/22/21 06:44  10/22/21 06:44  10/22/21 06:44  10/22/21 06:44  10/22/21 06:44

## 2021-11-08 ENCOUNTER — HOSPITAL ENCOUNTER (OUTPATIENT)
Dept: HOSPITAL 47 - ORWHC2ENDO | Age: 61
Discharge: HOME | End: 2021-11-08
Attending: SURGERY
Payer: COMMERCIAL

## 2021-11-08 VITALS — BODY MASS INDEX: 32 KG/M2

## 2021-11-08 VITALS — SYSTOLIC BLOOD PRESSURE: 161 MMHG | DIASTOLIC BLOOD PRESSURE: 92 MMHG | HEART RATE: 59 BPM

## 2021-11-08 VITALS — TEMPERATURE: 98.4 F

## 2021-11-08 VITALS — RESPIRATION RATE: 16 BRPM

## 2021-11-08 DIAGNOSIS — Z79.899: ICD-10-CM

## 2021-11-08 DIAGNOSIS — I10: ICD-10-CM

## 2021-11-08 DIAGNOSIS — K57.90: ICD-10-CM

## 2021-11-08 DIAGNOSIS — Z85.528: ICD-10-CM

## 2021-11-08 DIAGNOSIS — M19.90: ICD-10-CM

## 2021-11-08 DIAGNOSIS — D12.6: Primary | ICD-10-CM

## 2021-11-08 DIAGNOSIS — Z86.010: ICD-10-CM

## 2021-11-08 DIAGNOSIS — Z12.11: ICD-10-CM

## 2021-11-08 DIAGNOSIS — Z87.891: ICD-10-CM

## 2021-11-08 PROCEDURE — 88305 TISSUE EXAM BY PATHOLOGIST: CPT

## 2021-11-08 PROCEDURE — 45385 COLONOSCOPY W/LESION REMOVAL: CPT

## 2021-11-08 NOTE — P.PCN
Date of Procedure: 11/08/21


Description of Procedure: 























PREOPERATIVE DIAGNOSIS:


Personal history of colon polyps


Personal history sigmoid diverticulitis





POSTOPERATIVE DIAGNOSIS:


Sigmoid colon adenoma





OPERATION:


Colonoscopy to the ileocecal valve and appendiceal orifice, cecum


Colonoscopy with hot snare polypectomy





SURGEON: Rose Flores MD.





ANESTHESIA: MAC.





INDICATIONS:


The patient is an 61-year-old male with personal history of higher risk colon 

adenoma.  He presents for resection.  Last colonoscopy less than 5 years ago.  

Benefits and risks were described and informed consent was obtained.





DESCRIPTION OF PROCEDURE:


The patient had undergone Sutab prep.  The patient had been brought into the 

operating room and laid in the left lateral decubitus position.  After adequate 

intravenous sedation, the rectum was examined with 2% lidocaine jelly. The 

prostate was unremarkable.  External hemorrhoids were encountered. The rectal 

tone was within normal limits. No lesions were palpated in the rectal vault. An 

Olympus colonoscope was advanced until the cecum, ileocecal valve and 

appendiceal orifice were clearly viewed.  The prep was good.  No sigmoid 

diverticulosis was encountered.  Colonic polyps were found and removed.  No 

evidence of focal colitis was found. Retroflexion of the scope demonstrated 

grade 2 internal hemorrhoids without active bleeding or inflammation. The colon 

was desufflated. The patient had tolerated the procedure well. 





Withdrawal time was over 6 minutes.





FINDINGS:


Aronchick preparation quality scale 2 (1-5)


Internal hemorrhoids, grade 2


External hemorrhoids, grade 2.


No arteriovenous malformations.


No sigmoid diverticulosis


Sigmoid colon resection and anastomosis viewed and 15 suture


Removal of 2 polyps:


- Snare polypectomy 20 cm from the anal verge, 6 mm tubulovillous adenoma polyp.


- Cold forceps biopsy at proximal transverse colon, 4 mm polyp.


No focal colitis.





RECOMMENDATIONS:


Repeat colonoscopy in 3 years, 2024





Plan - Discharge Summary


Discharge Rx Participant: Yes


New Discharge Prescriptions: 


Continue


   Benazepril/Hydrochlorothiazide [Lotensin Hct 20-12.5 mg Tablet] 1 tab PO QAM


   amLODIPine [Norvasc] 10 mg PO QAM


   Fexofenadine HCl [Allegra Allergy] 180 mg PO DAILY


   Zinc 50 mg PO DAILY


   Inulin/Chromium Picolinate [Fiber Gummies Chew] 1 tab PO DAILY


   Vitamin D Gummie 1 tab PO DAILY


Discharge Medication List





Benazepril/Hydrochlorothiazide [Lotensin Hct 20-12.5 mg Tablet] 1 tab PO QAM 

04/24/15 [History]


amLODIPine [Norvasc] 10 mg PO QAM 03/14/21 [History]


Fexofenadine HCl [Allegra Allergy] 180 mg PO DAILY 04/26/21 [History]


Zinc 50 mg PO DAILY 04/26/21 [History]


Inulin/Chromium Picolinate [Fiber Gummies Chew] 1 tab PO DAILY 10/20/21 

[History]


Vitamin D Gummie 1 tab PO DAILY 10/20/21 [History]








Follow up Appointment(s)/Referral(s): 


Rose Flores MD [STAFF PHYSICIAN] - As Needed


Patient Instructions/Handouts:  Colorectal Polyps (GEN), *[facility] - 

(Anesthesia) Endoscopy Discharge Instructions, Colonoscopy (DC)


Activity/Diet/Wound Care/Special Instructions: 


Repeat colonoscopy 3 years, 2024


Discharge Disposition: HOME SELF-CARE

## 2021-11-08 NOTE — P.GSHP
History of Present Illness


H&P Date: 11/08/21














CHIEF COMPLAINT: Colon screen





HISTORY OF PRESENT ILLNESS: The patient is a 61-year-old male who


presents for colon screen.  Lower endoscopy was offered for further evaluation 

and management.





PAST MEDICAL HISTORY: 


Please see list.





PAST SURGICAL HISTORY: 


Please see list.





MEDICATIONS: 


Please see list.





ALLERGIES:  Please see list. 





SOCIAL HISTORY: No illicit drug use





FAMILY HISTORY: No reports of Crohn disease or ulcerative colitis. 





REVIEW OF ORGAN SYSTEMS: 


CONSTITUTIONAL: No reports of fevers or chills.  





PHYSICAL EXAM: 


VITAL SIGNS:  Stable


GENERAL: Well-developed pleasant in no acute distress. 


HEENT: No scleral icterus. Extraocular movements grossly


intact. Moist buccal mucosa. 


NECK: Supple without lymphadenopathy. 


CHEST: Unlabored respirations. Equal bilateral excursions. 


CARDIOVASCULAR: Regular rate and rhythm. Distal 2+ pulses. 


ABDOMEN: Soft, nontender, nondistended. 


MUSCULOSKELETAL: No clubbing, cyanosis, or edema. 





ASSESSMENT: 


1.  Colon screen.





PLAN: 


1. Recommend proceeding with a lower endoscopy





Past Medical History


Past Medical History: Cancer, Hypertension, Osteoarthritis (OA)


Additional Past Medical History / Comment(s): hx Diverticulitis, vitiligo, hx 

renal cancer.  


History of Any Multi-Drug Resistant Organisms: None Reported


Past Surgical History: Bowel Resection, Cholecystectomy


Additional Past Surgical History / Comment(s): SINUS SURG, partial rt 

nephrectomy, left adrenal gland removed, colectomy 5/2021, CYDST REMOVED FROM 

BACK, COLONOSCOPY


Past Anesthesia/Blood Transfusion Reactions: Motion Sickness, Postoperative 

Nausea & Vomiting (PONV)


Additional Past Anesthesia/Blood Transfusion Reaction / Comment(s): no hx blood 

transfusion


Smoking Status: Former smoker





- Past Family History


  ** Mother


Family Medical History: No Reported History





Medications and Allergies


                                Home Medications











 Medication  Instructions  Recorded  Confirmed  Type


 


Benazepril/Hydrochlorothiazide 1 tab PO QAM 04/24/15 11/04/21 History





[Lotensin Hct 20-12.5 mg Tablet]    


 


amLODIPine [Norvasc] 10 mg PO QAM 03/14/21 11/04/21 History


 


Fexofenadine HCl [Allegra Allergy] 180 mg PO DAILY 04/26/21 11/04/21 History


 


Zinc 50 mg PO DAILY 04/26/21 11/04/21 History


 


Inulin/Chromium Picolinate [Fiber 1 tab PO DAILY 10/20/21 11/04/21 History





Gummies Chew]    


 


Vitamin D Gummie 1 tab PO DAILY 10/20/21 11/04/21 History








                                    Allergies











Allergy/AdvReac Type Severity Reaction Status Date / Time


 


adhesive tape AdvReac  RASH AND Verified 11/04/21 10:12





   BLISTERS

## 2021-12-06 ENCOUNTER — HOSPITAL ENCOUNTER (OUTPATIENT)
Dept: HOSPITAL 47 - RADCTMAIN | Age: 61
Discharge: HOME | End: 2021-12-06
Attending: INTERNAL MEDICINE
Payer: COMMERCIAL

## 2021-12-06 DIAGNOSIS — C64.1: Primary | ICD-10-CM

## 2021-12-06 LAB — BUN SERPL-SCNC: 18 MG/DL (ref 9–20)

## 2021-12-06 PROCEDURE — 36415 COLL VENOUS BLD VENIPUNCTURE: CPT

## 2021-12-06 PROCEDURE — 82565 ASSAY OF CREATININE: CPT

## 2021-12-06 PROCEDURE — 74177 CT ABD & PELVIS W/CONTRAST: CPT

## 2021-12-06 PROCEDURE — 84520 ASSAY OF UREA NITROGEN: CPT

## 2021-12-06 PROCEDURE — 71260 CT THORAX DX C+: CPT

## 2021-12-06 NOTE — CT
EXAMINATION TYPE: CT ChestAbdPelvis w con

 

DATE OF EXAM: 12/6/2021

 

COMPARISON: Most recent CT July 6, 2021 and older studies

 

HISTORY: Renal Cell CA, observe for mets

 

CT DLP: 1600.8 mGycm. Automated Exposure Control for Dose Reduction was Utilized.

 

CONTRAST: 

CT scan of the thorax, abdomen and pelvis is performed with oral and with IV Contrast, patient inject
ed with 100 mL of Isovue 300.

 

FINDINGS:

 

LUNGS: Mild linear scarring in the lingula is redemonstrated.  No suspicious new

Greater than 5 mm nodules or masses. There is no pleural effusion or pneumothorax seen.  The tracheob
ronchial tree is patent.

 

MEDIASTINUM: There are no greater than 1 cm hilar or mediastinal lymph nodes.   No cardiomegaly or pe
ricardial effusion is seen.  Coronary artery calcification again seen.

 

LIVER/GB: Triangular shaped hyperdensity right hepatic lobe extends to surface becomes slightly less 
prominent on delayed consistent with transient hepatic attenuation difference is redemonstrated.

 

PANCREAS: No significant abnormality is seen.

 

SPLEEN: No significant abnormality is seen.

 

ADRENALS: Stable 1.1 cm hyperdense nodule axial image 56 from most recent CT.

 

KIDNEYS: Focal defect posteriorly mid to lower pole level right kidney with some residual low dense t
issue axial image 70 is unchanged from most recent CT. Symmetric cortical medullary uptake and excret
ion without hydronephrosis seen bilaterally. There is 1.2 cm partially exophytic thin-walled cyst pos
teriorly mid pole level left kidney series 7 image 28 current study.

 

BOWEL: Oral contrast reaches level of left colon. No suspicious small or large bowel dilatation. Sutu
res at level of sigmoid colon in the left pelvis are redemonstrated. Small-sized hiatal hernia redemo
nstrated. Incidental normal contrast-filled appendix right lower quadrant again seen.

 

GENITAL ORGANS: Mildly enlarged prostate redemonstrated.

 

LYMPH NODES: No new greater than 1cm abdominal or pelvic lymph nodes are appreciated.

 

OSSEOUS STRUCTURES: Slight scoliotic curvature in the thoracic spine.

 

OTHER: No significant additional abnormality is seen.

 

IMPRESSION: Posttreatment change posteriorly mid to lower pole of the right kidney redemonstrated.  N
o new or enlarging masses or lymph nodes to suggest active neoplastic recurrence.

## 2022-06-06 ENCOUNTER — HOSPITAL ENCOUNTER (OUTPATIENT)
Dept: HOSPITAL 47 - RADCTMAIN | Age: 62
Discharge: HOME | End: 2022-06-06
Attending: INTERNAL MEDICINE
Payer: COMMERCIAL

## 2022-06-06 DIAGNOSIS — C64.9: Primary | ICD-10-CM

## 2022-06-06 DIAGNOSIS — E27.8: ICD-10-CM

## 2022-06-06 LAB — BUN SERPL-SCNC: 18 MG/DL (ref 9–20)

## 2022-06-06 PROCEDURE — 71260 CT THORAX DX C+: CPT

## 2022-06-06 PROCEDURE — 74177 CT ABD & PELVIS W/CONTRAST: CPT

## 2022-06-06 PROCEDURE — 82565 ASSAY OF CREATININE: CPT

## 2022-06-06 PROCEDURE — 84520 ASSAY OF UREA NITROGEN: CPT

## 2022-06-06 PROCEDURE — 36415 COLL VENOUS BLD VENIPUNCTURE: CPT

## 2022-06-06 NOTE — CT
EXAMINATION TYPE: CT ChestAbdPelvis w con

 

DATE OF EXAM: 6/6/2022

 

COMPARISON: CT dated 12/6/2021

 

HISTORY: Renal cell carcinoma

 

CT DLP: 1558.8 mGycm

Automated exposure control for dose reduction was used.

 

CONTRAST: 

CT scan of the chest, abdomen and pelvis is performed with Oral Contrast and with IV Contrast, patien
t injected with 70 mL of Isovue 300.

 

FINDINGS:

 

LUNGS: Stable left upper lobe millimetric calcified granuloma. Unremarkable lungs otherwise. Patent t
rachea and main bronchi. No pleural effusion.

 

MEDIASTINUM: There are no greater than 1 cm hilar or mediastinal lymph nodes. No cardiomegaly. Arteri
al atherosclerotic calcifications.  No pericardial effusion is seen.  

 

OTHER:  No aggressive bone lesion.

 

LIVER/GB: Signs of hepatic steatosis. No definite hepatic focal lesion. Previous cholecystectomy.

 

PANCREAS: No significant abnormality is seen.

 

SPLEEN: No significant abnormality is seen.

 

ADRENALS: Progressive enlargement of the right adrenal lesion measuring 2.3 cm compared to 1.9 cm in 
December 2021 CT scan. It measured 13 mm in April 2021. This could represent a progressive primary or
 metastatic right adrenal lesion. Left adrenal lesion.

 

KIDNEYS: Stable postsurgical changes along the posterior aspect of the lower pole of right kidney. Si
mple cyst is seen at the posterior aspect of the left kidney. Small cyst is seen at the central porti
on of the right kidney, appreciated previously. Unremarkable kidneys otherwise.

 

BOWEL:  Unremarkable colonic anastomosis in the pelvis. No bowel obstruction. Fecal loading of the co
dani. Segments of nonspecific colonic wall thickening, please correlate with colonoscopy results.

 

REPRODUCTIVE ORGANS: Enlarged prostate, please correlate with PSA level. Unremarkable seminal vesicle
s. Unremarkable urinary bladder.

 

LYMPH NODES: No greater than 1 cm abdominal or pelvic lymph nodes are appreciated.

 

OSSEOUS STRUCTURES: Severe bilateral L5-S1 facet osteoarthropathy. No aggressive bone lesion.

 

OTHER: No sizable ascites. Fat-containing inguinal hernias.

 

IMPRESSION: 

Stable postsurgical changes at the posterior aspect of the lower pole of right kidney.

 

Progressive right adrenal lesion as described above, nonspecific and could be related to progressive 
primary or metastatic adrenal lesion. Recommend clinical correlation and further workup. Further PET 
scan or MIBG scan assessment can be considered.

 

Otherwise no evidence of metastatic disease seen in the chest, abdomen or the pelvis. Other findings 
as described above.

## 2022-11-07 ENCOUNTER — HOSPITAL ENCOUNTER (OUTPATIENT)
Dept: HOSPITAL 47 - LABWHC1 | Age: 62
Discharge: HOME | End: 2022-11-07
Attending: UROLOGY
Payer: COMMERCIAL

## 2022-11-07 DIAGNOSIS — I10: ICD-10-CM

## 2022-11-07 DIAGNOSIS — C64.1: Primary | ICD-10-CM

## 2022-11-07 LAB
ANION GAP SERPL CALC-SCNC: 9.7 MMOL/L (ref 10–18)
BUN SERPL-SCNC: 18.2 MG/DL (ref 9–27)
CHLORIDE SERPL-SCNC: 99 MMOL/L (ref 96–109)
CO2 SERPL-SCNC: 27.3 MMOL/L (ref 20–27.5)
ERYTHROCYTE [DISTWIDTH] IN BLOOD BY AUTOMATED COUNT: 5.38 X 10*6/UL (ref 4.4–5.6)
ERYTHROCYTE [DISTWIDTH] IN BLOOD: 12.8 % (ref 11.5–14.5)
HCT VFR BLD AUTO: 47.9 % (ref 39.6–50)
HGB BLD-MCNC: 16.4 G/DL (ref 13–17)
MCH RBC QN AUTO: 30.5 PG (ref 27–32)
MCHC RBC AUTO-ENTMCNC: 34.2 G/DL (ref 32–37)
MCV RBC AUTO: 89 FL (ref 80–97)
NRBC BLD AUTO-RTO: 0 /100 WBCS (ref 0–0)
PLATELET # BLD AUTO: 291 X 10*3/UL (ref 140–440)
POTASSIUM SERPL-SCNC: 4.4 MMOL/L (ref 3.5–5.5)
SODIUM SERPL-SCNC: 136 MMOL/L (ref 135–145)
WBC # BLD AUTO: 9.17 X 10*3/UL (ref 4.5–10)

## 2022-11-07 PROCEDURE — 36415 COLL VENOUS BLD VENIPUNCTURE: CPT

## 2022-11-07 PROCEDURE — 80051 ELECTROLYTE PANEL: CPT

## 2022-11-07 PROCEDURE — 82565 ASSAY OF CREATININE: CPT

## 2022-11-07 PROCEDURE — 84520 ASSAY OF UREA NITROGEN: CPT

## 2022-11-07 PROCEDURE — 93005 ELECTROCARDIOGRAM TRACING: CPT

## 2022-11-07 PROCEDURE — 85027 COMPLETE CBC AUTOMATED: CPT

## 2022-11-22 ENCOUNTER — HOSPITAL ENCOUNTER (OUTPATIENT)
Dept: HOSPITAL 47 - EC | Age: 62
Setting detail: OBSERVATION
LOS: 1 days | Discharge: HOME | End: 2022-11-23
Attending: FAMILY MEDICINE | Admitting: FAMILY MEDICINE
Payer: COMMERCIAL

## 2022-11-22 DIAGNOSIS — R74.01: ICD-10-CM

## 2022-11-22 DIAGNOSIS — M19.90: ICD-10-CM

## 2022-11-22 DIAGNOSIS — Z88.1: ICD-10-CM

## 2022-11-22 DIAGNOSIS — Z85.858: ICD-10-CM

## 2022-11-22 DIAGNOSIS — Z85.528: ICD-10-CM

## 2022-11-22 DIAGNOSIS — R00.0: ICD-10-CM

## 2022-11-22 DIAGNOSIS — I08.2: ICD-10-CM

## 2022-11-22 DIAGNOSIS — J90: ICD-10-CM

## 2022-11-22 DIAGNOSIS — Z90.5: ICD-10-CM

## 2022-11-22 DIAGNOSIS — E87.8: ICD-10-CM

## 2022-11-22 DIAGNOSIS — Z91.048: ICD-10-CM

## 2022-11-22 DIAGNOSIS — Z20.822: ICD-10-CM

## 2022-11-22 DIAGNOSIS — I10: ICD-10-CM

## 2022-11-22 DIAGNOSIS — Z90.49: ICD-10-CM

## 2022-11-22 DIAGNOSIS — L80: ICD-10-CM

## 2022-11-22 DIAGNOSIS — R19.7: ICD-10-CM

## 2022-11-22 DIAGNOSIS — E87.1: ICD-10-CM

## 2022-11-22 DIAGNOSIS — K76.0: ICD-10-CM

## 2022-11-22 DIAGNOSIS — Z79.899: ICD-10-CM

## 2022-11-22 DIAGNOSIS — Z87.19: ICD-10-CM

## 2022-11-22 DIAGNOSIS — Z87.891: ICD-10-CM

## 2022-11-22 DIAGNOSIS — R94.6: ICD-10-CM

## 2022-11-22 DIAGNOSIS — E27.40: Primary | ICD-10-CM

## 2022-11-22 DIAGNOSIS — I95.9: ICD-10-CM

## 2022-11-22 LAB
ALBUMIN SERPL-MCNC: 3.9 G/DL (ref 3.5–5)
ALP SERPL-CCNC: 67 U/L (ref 38–126)
ALT SERPL-CCNC: 69 U/L (ref 4–49)
ANION GAP SERPL CALC-SCNC: 16 MMOL/L
APTT BLD: 24.1 SEC (ref 22–30)
AST SERPL-CCNC: 58 U/L (ref 17–59)
BASOPHILS # BLD AUTO: 0.1 K/UL (ref 0–0.2)
BASOPHILS NFR BLD AUTO: 1 %
BILIRUB UR QL STRIP.AUTO: (no result)
BUN SERPL-SCNC: 24 MG/DL (ref 9–20)
CALCIUM SPEC-MCNC: 9.5 MG/DL (ref 8.4–10.2)
CHLORIDE SERPL-SCNC: 95 MMOL/L (ref 98–107)
CO2 SERPL-SCNC: 16 MMOL/L (ref 22–30)
EOSINOPHIL # BLD AUTO: 0.8 K/UL (ref 0–0.7)
EOSINOPHIL NFR BLD AUTO: 7 %
ERYTHROCYTE [DISTWIDTH] IN BLOOD BY AUTOMATED COUNT: 5 M/UL (ref 4.3–5.9)
ERYTHROCYTE [DISTWIDTH] IN BLOOD: 12 % (ref 11.5–15.5)
GLUCOSE SERPL-MCNC: 96 MG/DL (ref 74–99)
HCT VFR BLD AUTO: 43.6 % (ref 39–53)
HGB BLD-MCNC: 15.6 GM/DL (ref 13–17.5)
HYALINE CASTS UR QL AUTO: 31 /LPF (ref 0–2)
INR PPP: 1 (ref ?–1.2)
KETONES UR QL STRIP.AUTO: (no result)
LYMPHOCYTES # SPEC AUTO: 2.6 K/UL (ref 1–4.8)
LYMPHOCYTES NFR SPEC AUTO: 25 %
MAGNESIUM SPEC-SCNC: 1.8 MG/DL (ref 1.6–2.3)
MCH RBC QN AUTO: 31.1 PG (ref 25–35)
MCHC RBC AUTO-ENTMCNC: 35.7 G/DL (ref 31–37)
MCV RBC AUTO: 87.1 FL (ref 80–100)
MONOCYTES # BLD AUTO: 0.9 K/UL (ref 0–1)
MONOCYTES NFR BLD AUTO: 8 %
NEUTROPHILS # BLD AUTO: 5.9 K/UL (ref 1.3–7.7)
NEUTROPHILS NFR BLD AUTO: 56 %
PH UR: 5.5 [PH] (ref 5–8)
PLATELET # BLD AUTO: 347 K/UL (ref 150–450)
POTASSIUM SERPL-SCNC: 4 MMOL/L (ref 3.5–5.1)
PROT SERPL-MCNC: 6.9 G/DL (ref 6.3–8.2)
PROT UR QL: (no result)
PT BLD: 10.6 SEC (ref 9–12)
RBC UR QL: 1 /HPF (ref 0–5)
SODIUM SERPL-SCNC: 127 MMOL/L (ref 137–145)
SP GR UR: 1.02 (ref 1–1.03)
SQUAMOUS UR QL AUTO: <1 /HPF (ref 0–4)
UROBILINOGEN UR QL STRIP: 2 MG/DL (ref ?–2)
WBC # BLD AUTO: 10.4 K/UL (ref 3.8–10.6)
WBC # UR AUTO: 6 /HPF (ref 0–5)

## 2022-11-22 PROCEDURE — 80053 COMPREHEN METABOLIC PANEL: CPT

## 2022-11-22 PROCEDURE — 36415 COLL VENOUS BLD VENIPUNCTURE: CPT

## 2022-11-22 PROCEDURE — 85730 THROMBOPLASTIN TIME PARTIAL: CPT

## 2022-11-22 PROCEDURE — 96374 THER/PROPH/DIAG INJ IV PUSH: CPT

## 2022-11-22 PROCEDURE — 83735 ASSAY OF MAGNESIUM: CPT

## 2022-11-22 PROCEDURE — 85025 COMPLETE CBC W/AUTO DIFF WBC: CPT

## 2022-11-22 PROCEDURE — 80048 BASIC METABOLIC PNL TOTAL CA: CPT

## 2022-11-22 PROCEDURE — 93306 TTE W/DOPPLER COMPLETE: CPT

## 2022-11-22 PROCEDURE — 97161 PT EVAL LOW COMPLEX 20 MIN: CPT

## 2022-11-22 PROCEDURE — 84443 ASSAY THYROID STIM HORMONE: CPT

## 2022-11-22 PROCEDURE — 93005 ELECTROCARDIOGRAM TRACING: CPT

## 2022-11-22 PROCEDURE — 82533 TOTAL CORTISOL: CPT

## 2022-11-22 PROCEDURE — 87040 BLOOD CULTURE FOR BACTERIA: CPT

## 2022-11-22 PROCEDURE — 82024 ASSAY OF ACTH: CPT

## 2022-11-22 PROCEDURE — 71046 X-RAY EXAM CHEST 2 VIEWS: CPT

## 2022-11-22 PROCEDURE — 85610 PROTHROMBIN TIME: CPT

## 2022-11-22 PROCEDURE — 96376 TX/PRO/DX INJ SAME DRUG ADON: CPT

## 2022-11-22 PROCEDURE — 81001 URINALYSIS AUTO W/SCOPE: CPT

## 2022-11-22 PROCEDURE — 87635 SARS-COV-2 COVID-19 AMP PRB: CPT

## 2022-11-22 PROCEDURE — 99285 EMERGENCY DEPT VISIT HI MDM: CPT

## 2022-11-22 PROCEDURE — 84484 ASSAY OF TROPONIN QUANT: CPT

## 2022-11-22 PROCEDURE — 84439 ASSAY OF FREE THYROXINE: CPT

## 2022-11-22 PROCEDURE — 83605 ASSAY OF LACTIC ACID: CPT

## 2022-11-22 PROCEDURE — 96372 THER/PROPH/DIAG INJ SC/IM: CPT

## 2022-11-22 RX ADMIN — HYDROCORTISONE SODIUM SUCCINATE SCH MG: 100 INJECTION, POWDER, FOR SOLUTION INTRAMUSCULAR; INTRAVENOUS at 17:23

## 2022-11-22 RX ADMIN — DEXTROSE MONOHYDRATE AND SODIUM CHLORIDE SCH MLS/HR: 5; .9 INJECTION, SOLUTION INTRAVENOUS at 13:58

## 2022-11-22 NOTE — P.HPIM
History of Present Illness


H&P Date: 11/22/22





Patient is a 62-year-old male with PMH of renal cell carcinoma with metastatic 

disease to the adrenal gland, status post partial resection of the right kidney,

status post resection of the left adrenal gland presents to the ED for a 

constellation of symptoms.  Patient reports undergoing partial resection of the 

right adrenal gland last Wednesday by a surgeon in [place].  Patient reports 

progressive weakness and fatigue that has been ongoing since Friday. He reports 

diarrhea over the past 2 days. His stool is described as yellow and watery. He 

denies any headache, lower extremity edema, nausea or vomiting, fever or chills,

cough, chest pain, shortness of breath, palpitations, changes in urination.  He 

reports a decreased appetite during this time. He denies any dizziness, 

numbness/weakness/tingling of the extremities.  In the ED, he was noted to be 

hypotensive with BP of 90/56 and pulse of 104.  CBC and coagulation panel was 

benign.  CMP showed sodium of 127, chloride of 95, bicarb of 16, BUN of 24, ALT 

of 69.  Troponin was 0.031, EKG showing sinus tachycardia with T-wave inversion 

and Q waves.  TSH was 5.21.  Urinalysis showed 4+ ketones, small blood. Chest x-

ray showed small right pleural effusion with patchy airspace disease, infiltrate

versus atelectasis. Patient is admitted for adrenal insuficiency. 





Pertinent positives and negatives as discussed in HPI, a complete review of 

systems was performed and all other systems are negative.





General: non toxic, no distress, appears at stated age


Derm: warm, dry


Head: atraumatic, normocephalic, symmetric


Eyes: EOMI, no lid lag, anicteric sclera


Mouth: no lip lesion, mucus membranes moist


Cardiovascular: S1S2 reg, systolic murmur, positive posterior tibial pulse 

bilateral, 


Lungs: Decreased breath sounds bilateral, no rhonchi, no rales , no accessory 

muscle use


Abdominal: soft,  nontender to palpation, no guarding, no appreciable 

organomegaly


Ext: no gross muscle atrophy, no edema, no contractures


Neuro:  CN II-XI grossly intact, no focal neuro deficits


Psych: Alert, oriented, appropriate affect 





#Adrenal insufficiency


#Hypotension


#Hyponatremia with hypochloremia and elevated BUN


#Generalized weakness


Patient is given Solu-Cortef 100 mg IV in the ED.


Continue Solu-Cortef 50 mg IV every 6 hours.


Continue D5 normal saline at 100 mL/h.


Telemetry monitoring.


Fall precautions.


Monitor vital signs every 2 hours.


PT and OT will be consulted to work with this patient.





#Diarrhea


Obtain COVID 19 test.


Rule out C.difficile.





#Elevated TSH


Obtain FT4.





#Transaminitis


Hepatic steatosis seen on CT AP 6/2021. 


Continue to monitor.





#Renal cell CA with metastatic disease


Oncology will be consulted for further management of this patient.





DVT prophylaxis: Heparin


Discussed with: Patient, wife, ED physician


Anticipated discharge: 2-3 days


Anticipated discharge place: Home


A total of 35 minutes was spent on the care of this complex patient more than 

50% of the time was spent in counseling and care coordination. 





Patient names his wife decision-maker if he cant make decisions for himself.  SIM

jennifer would like to be FULL CODE.





Past Medical History


Past Medical History: Cancer, Hypertension, Osteoarthritis (OA)


Additional Past Medical History / Comment(s): hx Diverticulitis, vitiligo, hx 

renal cancer.  


History of Any Multi-Drug Resistant Organisms: None Reported


Past Surgical History: Bowel Resection, Cholecystectomy


Additional Past Surgical History / Comment(s): SINUS SURG, partial rt 

nephrectomy, left adrenal gland removed, colectomy 5/2021, CYDST REMOVED FROM 

BACK, COLONOSCOPY


Past Anesthesia/Blood Transfusion Reactions: Motion Sickness, Postoperative 

Nausea & Vomiting (PONV)


Additional Past Anesthesia/Blood Transfusion Reaction / Comment(s): no hx blood 

transfusion


Past Psychological History: No Psychological Hx Reported


Smoking Status: Former smoker


Past Alcohol Use History: Occasional


Past Drug Use History: Marijuana





- Past Family History


  ** Mother


Family Medical History: No Reported History





Medications and Allergies


                                Home Medications











 Medication  Instructions  Recorded  Confirmed  Type


 


Benazepril/Hydrochlorothiazide 1 tab PO DAILY 04/24/15 11/22/22 History





[Lotensin Hct 20-12.5 mg Tablet]    


 


Fexofenadine HCl [Allegra Allergy] 180 mg PO DAILY 04/26/21 11/22/22 History


 


Inulin/Chromium Picolinate [Fiber 1 tab PO DAILY 10/20/21 11/22/22 History





Gummies Chew]    


 


Cholecalciferol [Vitamin D3 (25 50 mcg PO DAILY 11/22/22 11/22/22 History





Mcg = 1000 Iu)]    








                                    Allergies











Allergy/AdvReac Type Severity Reaction Status Date / Time


 


adhesive tape Allergy  RASH AND Verified 11/22/22 14:17





   BLISTERS  


 


ciprofloxacin [From Cipro] AdvReac  Nausea & Verified 11/22/22 14:17





   Vomiting  














Physical Exam


Vitals: 


                                   Vital Signs











  Temp Pulse Resp BP Pulse Ox


 


 11/22/22 14:30   96  18  102/56  96


 


 11/22/22 14:00   98  20  116/65  95


 


 11/22/22 13:30   93  17  105/66  97


 


 11/22/22 13:14   98  15  105/66  95


 


 11/22/22 11:59  98.4 F  104 H  24  90/56  97








                                Intake and Output











 11/22/22 11/22/22 11/22/22





 06:59 14:59 22:59


 


Other:   


 


  Weight  99.79 kg 














Results


CBC & Chem 7: 


                                 11/22/22 12:07





                                 11/22/22 12:07


Labs: 


                  Abnormal Lab Results - Last 24 Hours (Table)











  11/22/22 11/22/22 11/22/22 Range/Units





  12:07 12:07 13:55 


 


Eosinophils #  0.8 H    (0-0.7)  k/uL


 


Sodium   127 L   (137-145)  mmol/L


 


Chloride   95 L   ()  mmol/L


 


Carbon Dioxide   16 L   (22-30)  mmol/L


 


BUN   24 H   (9-20)  mg/dL


 


ALT   69 H   (4-49)  U/L


 


TSH   5.210 H   (0.465-4.680)  mIU/L


 


Urine Protein    1+ H  (Negative)  


 


Urine Ketones    4+ H  (Negative)  


 


Urine Blood    Small H  (Negative)  


 


Urine Bilirubin    1+ H  (Negative)  


 


Urine WBC    6 H  (0-5)  /hpf


 


Hyaline Casts    31 H  (0-2)  /lpf


 


Urine Mucus    Moderate H  (None)  /hpf

## 2022-11-22 NOTE — XR
EXAMINATION TYPE: XR chest 2V

 

DATE OF EXAM: 11/22/2022 1:21 PM

 

COMPARISON: Chest radiographs from 3/19/2018, CT chest abdomen pelvis 6/6/2022

 

TECHNIQUE: XR chest 2V Frontal and lateral views of the chest.

 

CLINICAL INDICATION:Male, 62 years old with history of Weakness; 

 

FINDINGS: 

Lungs/Pleura: No pneumothorax or focal consolidation. Small right pleural effusion with patchy airspa
ce disease.

Pulmonary vascularity: Unremarkable.

Heart/mediastinum: Cardiomediastinal silhouette is unremarkable.

Musculoskeletal: No acute osseous pathology.

 

IMPRESSION: 

Small right pleural effusion with patchy airspace disease which may be related to infiltrate versus a
telectasis.

## 2022-11-22 NOTE — ED
General Adult HPI





- General


Chief complaint: Weakness


Stated complaint: 1wk post op weakness


Time Seen by Provider: 11/22/22 12:00


Source: patient, RN notes reviewed, old records reviewed


Mode of arrival: ambulatory


Limitations: no limitations





- History of Present Illness


Initial comments: 





This is a 62-year-old male who presents to the emergency department complaining 

of generalized weakness.  Patient states he just had adrenal surgery and they 

removed His adrenal gland.  Patient states he previously had other adrenal gland

removed secondary to cancer.  Patient states since his surgery on Wednesday that

he had an [place] he has been extremely weak and not wanting to eat having 

difficulty sleeping and very fatigued.  Patient denies any pain.  Patient denies

any headache patient denies numbness or weakness.  Patient denies any chest pain

palpitations difficulty breathing or shortness of breath per patient denies any 

fever chills.  Patient states he is on Cipro for prophylactic treatment of any 

possible infection.  Patient states he does not have any abdominal pain is not 

nauseous but he is having diarrhea.





- Related Data


                                Home Medications











 Medication  Instructions  Recorded  Confirmed


 


Benazepril/Hydrochlorothiazide 1 tab PO DAILY 04/24/15 11/22/22





[Lotensin Hct 20-12.5 mg Tablet]   


 


Fexofenadine HCl [Allegra Allergy] 180 mg PO DAILY 04/26/21 11/22/22


 


Inulin/Chromium Picolinate [Fiber 1 tab PO DAILY 10/20/21 11/22/22





Gummies Chew]   


 


Cholecalciferol [Vitamin D3 (25 50 mcg PO DAILY 11/22/22 11/22/22





Mcg = 1000 Iu)]   











                                    Allergies











Allergy/AdvReac Type Severity Reaction Status Date / Time


 


adhesive tape Allergy  RASH AND Verified 11/22/22 14:17





   BLISTERS  


 


ciprofloxacin [From Cipro] AdvReac  Nausea & Verified 11/22/22 14:17





   Vomiting  














Review of Systems


ROS Statement: 


Those systems with pertinent positive or pertinent negative responses have been 

documented in the HPI.





ROS Other: All systems not noted in ROS Statement are negative.





Past Medical History


Past Medical History: Cancer, Hypertension, Osteoarthritis (OA)


Additional Past Medical History / Comment(s): hx Diverticulitis, vitiligo, hx 

renal cancer.  


History of Any Multi-Drug Resistant Organisms: None Reported


Past Surgical History: Bowel Resection, Cholecystectomy


Additional Past Surgical History / Comment(s): SINUS SURG, partial rt 

nephrectomy, left adrenal gland removed, colectomy 5/2021, CYDST REMOVED FROM 

BACK, COLONOSCOPY


Past Anesthesia/Blood Transfusion Reactions: Motion Sickness, Postoperative 

Nausea & Vomiting (PONV)


Additional Past Anesthesia/Blood Transfusion Reaction / Comment(s): no hx blood 

transfusion


Past Psychological History: No Psychological Hx Reported


Smoking Status: Former smoker


Past Alcohol Use History: Occasional


Past Drug Use History: Marijuana





- Past Family History


  ** Mother


Family Medical History: No Reported History





General Exam





- General Exam Comments


Initial Comments: 





GENERAL:


Patient is well-developed and well-nourished.  Patient is nontoxic and 

well-hydrated and is in mild distress.  Patient seems very tired and fatigued





ENT:


Neck is soft and supple.  No significant lymphadenopathy is noted.  Oropharynx 

is clear.  Moist mucous membranes.  Neck has full range of motion without 

eliciting any pain. 





EYES:


The sclera were anicteric and conjunctiva were pink and moist.  Extraocular 

movements were intact and pupils were equal round and reactive to light.  

Eyelids were unremarkable.





PULMONARY:


Unlabored respirations.  Good breath sounds bilaterally.  No audible rales 

rhonchi or wheezing was noted.





CARDIOVASCULAR:


There is a regular rate and rhythm without any murmurs gallops or rubs. 





ABDOMEN:


Soft and nontender with normal bowel sounds. 





SKIN:


Skin is clear with no lesions or rashes and otherwise unremarkable.





NEUROLOGIC:


Patient is alert and oriented x3.  Cranial nerves II through XII are grossly 

intact.  Motor and sensory are also intact.  Normal speech, volume and content. 

Symmetrical smile. 





MUSCULOSKELETAL:


Normal extremities with adequate strength and full range of motion.  No lower 

extremity swelling or edema.  No calf tenderness.





LYMPHATICS:


No significant lymphadenopathy is noted





PSYCHIATRIC:


Normal psychiatric evaluation.  


Limitations: no limitations





Course


                                   Vital Signs











  11/22/22 11/22/22





  11:59 13:14


 


Temperature 98.4 F 


 


Pulse Rate 104 H 98


 


Respiratory 24 15





Rate  


 


Blood Pressure 90/56 105/66


 


O2 Sat by Pulse 97 95





Oximetry  














Medical Decision Making





- Medical Decision Making





EKG was interpreted by me.  It shows a sinus tachycardia at 103 bpm SC interval 

200 QRS is 88 QT interval 374 QTC is 433.  Patient's EKG shows no ST segment 

elevation or depression.





I interpreted the chest x-ray shows pleural effusion.  Aside from that I saw no 

acute normalities.





Patient received 100 mg of hydrocortisone and the patient was given a bolus of 

fluid and then started on D5 0.9 normal saline.





I spoke with some physicians agreed to admit the patient admitted the patient 

wrote admitting orders.





- Lab Data


Result diagrams: 


                                 11/22/22 12:07





                                 11/22/22 12:07


                                   Lab Results











  11/22/22 11/22/22 11/22/22 Range/Units





  12:07 12:07 12:07 


 


WBC  10.4    (3.8-10.6)  k/uL


 


RBC  5.00    (4.30-5.90)  m/uL


 


Hgb  15.6    (13.0-17.5)  gm/dL


 


Hct  43.6    (39.0-53.0)  %


 


MCV  87.1    (80.0-100.0)  fL


 


MCH  31.1    (25.0-35.0)  pg


 


MCHC  35.7    (31.0-37.0)  g/dL


 


RDW  12.0    (11.5-15.5)  %


 


Plt Count  347    (150-450)  k/uL


 


MPV  7.8    


 


Neutrophils %  56    %


 


Lymphocytes %  25    %


 


Monocytes %  8    %


 


Eosinophils %  7    %


 


Basophils %  1    %


 


Neutrophils #  5.9    (1.3-7.7)  k/uL


 


Lymphocytes #  2.6    (1.0-4.8)  k/uL


 


Monocytes #  0.9    (0-1.0)  k/uL


 


Eosinophils #  0.8 H    (0-0.7)  k/uL


 


Basophils #  0.1    (0-0.2)  k/uL


 


PT   10.6   (9.0-12.0)  sec


 


INR   1.0   (<1.2)  


 


APTT   24.1   (22.0-30.0)  sec


 


Sodium    127 L  (137-145)  mmol/L


 


Potassium    4.0  (3.5-5.1)  mmol/L


 


Chloride    95 L  ()  mmol/L


 


Carbon Dioxide    16 L  (22-30)  mmol/L


 


Anion Gap    16  mmol/L


 


BUN    24 H  (9-20)  mg/dL


 


Creatinine    1.13  (0.66-1.25)  mg/dL


 


Est GFR (CKD-EPI)AfAm    80  (>60 ml/min/1.73 sqM)  


 


Est GFR (CKD-EPI)NonAf    69  (>60 ml/min/1.73 sqM)  


 


Glucose    96  (74-99)  mg/dL


 


Plasma Lactic Acid Yrn     (0.7-2.0)  mmol/L


 


Calcium    9.5  (8.4-10.2)  mg/dL


 


Magnesium    1.8  (1.6-2.3)  mg/dL


 


Total Bilirubin    0.6  (0.2-1.3)  mg/dL


 


AST    58  (17-59)  U/L


 


ALT    69 H  (4-49)  U/L


 


Alkaline Phosphatase    67  ()  U/L


 


Troponin I     (0.000-0.034)  ng/mL


 


Total Protein    6.9  (6.3-8.2)  g/dL


 


Albumin    3.9  (3.5-5.0)  g/dL


 


TSH    5.210 H  (0.465-4.680)  mIU/L


 


Urine Color     


 


Urine Appearance     (Clear)  


 


Urine pH     (5.0-8.0)  


 


Ur Specific Gravity     (1.001-1.035)  


 


Urine Protein     (Negative)  


 


Urine Glucose (UA)     (Negative)  


 


Urine Ketones     (Negative)  


 


Urine Blood     (Negative)  


 


Urine Nitrite     (Negative)  


 


Urine Bilirubin     (Negative)  


 


Urine Urobilinogen     (<2.0)  mg/dL


 


Ur Leukocyte Esterase     (Negative)  


 


Urine RBC     (0-5)  /hpf


 


Urine WBC     (0-5)  /hpf


 


Ur Squamous Epith Cells     (0-4)  /hpf


 


Hyaline Casts     (0-2)  /lpf


 


Urine Mucus     (None)  /hpf














  11/22/22 11/22/22 11/22/22 Range/Units





  12:07 12:07 13:55 


 


WBC     (3.8-10.6)  k/uL


 


RBC     (4.30-5.90)  m/uL


 


Hgb     (13.0-17.5)  gm/dL


 


Hct     (39.0-53.0)  %


 


MCV     (80.0-100.0)  fL


 


MCH     (25.0-35.0)  pg


 


MCHC     (31.0-37.0)  g/dL


 


RDW     (11.5-15.5)  %


 


Plt Count     (150-450)  k/uL


 


MPV     


 


Neutrophils %     %


 


Lymphocytes %     %


 


Monocytes %     %


 


Eosinophils %     %


 


Basophils %     %


 


Neutrophils #     (1.3-7.7)  k/uL


 


Lymphocytes #     (1.0-4.8)  k/uL


 


Monocytes #     (0-1.0)  k/uL


 


Eosinophils #     (0-0.7)  k/uL


 


Basophils #     (0-0.2)  k/uL


 


PT     (9.0-12.0)  sec


 


INR     (<1.2)  


 


APTT     (22.0-30.0)  sec


 


Sodium     (137-145)  mmol/L


 


Potassium     (3.5-5.1)  mmol/L


 


Chloride     ()  mmol/L


 


Carbon Dioxide     (22-30)  mmol/L


 


Anion Gap     mmol/L


 


BUN     (9-20)  mg/dL


 


Creatinine     (0.66-1.25)  mg/dL


 


Est GFR (CKD-EPI)AfAm     (>60 ml/min/1.73 sqM)  


 


Est GFR (CKD-EPI)NonAf     (>60 ml/min/1.73 sqM)  


 


Glucose     (74-99)  mg/dL


 


Plasma Lactic Acid Yrn  1.5    (0.7-2.0)  mmol/L


 


Calcium     (8.4-10.2)  mg/dL


 


Magnesium     (1.6-2.3)  mg/dL


 


Total Bilirubin     (0.2-1.3)  mg/dL


 


AST     (17-59)  U/L


 


ALT     (4-49)  U/L


 


Alkaline Phosphatase     ()  U/L


 


Troponin I   0.031   (0.000-0.034)  ng/mL


 


Total Protein     (6.3-8.2)  g/dL


 


Albumin     (3.5-5.0)  g/dL


 


TSH     (0.465-4.680)  mIU/L


 


Urine Color    Yellow  


 


Urine Appearance    Clear  (Clear)  


 


Urine pH    5.5  (5.0-8.0)  


 


Ur Specific Gravity    1.022  (1.001-1.035)  


 


Urine Protein    1+ H  (Negative)  


 


Urine Glucose (UA)    Negative  (Negative)  


 


Urine Ketones    4+ H  (Negative)  


 


Urine Blood    Small H  (Negative)  


 


Urine Nitrite    Negative  (Negative)  


 


Urine Bilirubin    1+ H  (Negative)  


 


Urine Urobilinogen    2.0  (<2.0)  mg/dL


 


Ur Leukocyte Esterase    Negative  (Negative)  


 


Urine RBC    1  (0-5)  /hpf


 


Urine WBC    6 H  (0-5)  /hpf


 


Ur Squamous Epith Cells    <1  (0-4)  /hpf


 


Hyaline Casts    31 H  (0-2)  /lpf


 


Urine Mucus    Moderate H  (None)  /hpf














Disposition


Clinical Impression: 


 Weakness, Adrenal insufficiency, Pleural effusion, Hyponatremia, Diarrhea





Disposition: ADMITTED AS IP TO THIS [facility]


Referrals: 


Stan Hinojosa MD [Primary Care Provider] - 1-2 days


Time of Disposition: 14:36

## 2022-11-23 VITALS — TEMPERATURE: 97.7 F | DIASTOLIC BLOOD PRESSURE: 78 MMHG | SYSTOLIC BLOOD PRESSURE: 129 MMHG | RESPIRATION RATE: 18 BRPM

## 2022-11-23 VITALS — HEART RATE: 87 BPM

## 2022-11-23 LAB
ANION GAP SERPL CALC-SCNC: 13.3 MMOL/L (ref 10–18)
BASOPHILS # BLD AUTO: 0.08 X 10*3/UL (ref 0–0.1)
BASOPHILS NFR BLD AUTO: 0.6 %
BUN SERPL-SCNC: 16.8 MG/DL (ref 9–27)
BUN/CREAT SERPL: 18.67 RATIO (ref 12–20)
CALCIUM SPEC-MCNC: 9.2 MG/DL (ref 8.7–10.3)
CHLORIDE SERPL-SCNC: 96 MMOL/L (ref 96–109)
CO2 SERPL-SCNC: 19.7 MMOL/L (ref 20–27.5)
EOSINOPHIL # BLD AUTO: 0.05 X 10*3/UL (ref 0.04–0.35)
EOSINOPHIL NFR BLD AUTO: 0.4 %
ERYTHROCYTE [DISTWIDTH] IN BLOOD BY AUTOMATED COUNT: 4.45 X 10*6/UL (ref 4.4–5.6)
ERYTHROCYTE [DISTWIDTH] IN BLOOD: 12 % (ref 11.5–14.5)
GLUCOSE BLD-MCNC: 169 MG/DL (ref 70–110)
GLUCOSE SERPL-MCNC: 142 MG/DL (ref 70–110)
HCT VFR BLD AUTO: 39 % (ref 39.6–50)
HGB BLD-MCNC: 13.6 G/DL (ref 13–17)
IMM GRANULOCYTES BLD QL AUTO: 0.9 %
LYMPHOCYTES # SPEC AUTO: 1.3 X 10*3/UL (ref 0.9–5)
LYMPHOCYTES NFR SPEC AUTO: 9.5 %
MCH RBC QN AUTO: 30.6 PG (ref 27–32)
MCHC RBC AUTO-ENTMCNC: 34.9 G/DL (ref 32–37)
MCV RBC AUTO: 87.6 FL (ref 80–97)
MONOCYTES # BLD AUTO: 1.06 X 10*3/UL (ref 0.2–1)
MONOCYTES NFR BLD AUTO: 7.8 %
NEUTROPHILS # BLD AUTO: 11.01 X 10*3/UL (ref 1.8–7.7)
NEUTROPHILS NFR BLD AUTO: 80.8 %
NRBC BLD AUTO-RTO: 0 /100 WBCS (ref 0–0)
PLATELET # BLD AUTO: 351 X 10*3/UL (ref 140–440)
POTASSIUM SERPL-SCNC: 4.4 MMOL/L (ref 3.5–5.5)
SODIUM SERPL-SCNC: 129 MMOL/L (ref 135–145)
T4 FREE SERPL-MCNC: 1.31 NG/DL (ref 0.8–1.8)
WBC # BLD AUTO: 13.62 X 10*3/UL (ref 4.5–10)

## 2022-11-23 RX ADMIN — HYDROCORTISONE SODIUM SUCCINATE SCH MG: 100 INJECTION, POWDER, FOR SOLUTION INTRAMUSCULAR; INTRAVENOUS at 05:37

## 2022-11-23 RX ADMIN — HEPARIN SODIUM SCH UNIT: 5000 INJECTION INTRAVENOUS; SUBCUTANEOUS at 10:30

## 2022-11-23 RX ADMIN — DEXTROSE MONOHYDRATE AND SODIUM CHLORIDE SCH MLS/HR: 5; .9 INJECTION, SOLUTION INTRAVENOUS at 01:16

## 2022-11-23 RX ADMIN — HYDROCORTISONE SODIUM SUCCINATE SCH MG: 100 INJECTION, POWDER, FOR SOLUTION INTRAMUSCULAR; INTRAVENOUS at 01:15

## 2022-11-23 RX ADMIN — HEPARIN SODIUM SCH: 5000 INJECTION INTRAVENOUS; SUBCUTANEOUS at 01:17

## 2022-11-23 NOTE — CA
Transthoracic Echo Report 

 Name: Jean Carlos Leavitt 

 MRN:    [id number] 

 Age:    62     Gender:     M 

 

 :    1960 

 Exam Date:     2022 08:48 

 Exam Location: [facility] 

 Ht (in):     69     Wt (lb):     220 

 Ordering Physician:        Leni Morfin MD 

 Attending/Referring Phys: 

 Technician         Noemí Larios RDCS 

 Procedure CPT: 

 Indications:       Murmur 

 

 Cardiac Hx: 

 Technical Quality:      Fair 

 Contrast 1:                                Total Dose (mL): 

 Contrast 2:                                Total Dose (mL): 

 

 MEASUREMENTS  (Male / Female) Normal Values 

 2D ECHO 

 LV Diastolic Diameter PLAX        4.4 cm                4.2 - 5.9 / 3.9 - 5.3 cm 

 LV Systolic Diameter PLAX         1.8 cm                 

 IVS Diastolic Thickness           1.5 cm                0.6 - 1.0 / 0.6 - 0.9 cm 

 LVPW Diastolic Thickness          1.4 cm                0.6 - 1.0 / 0.6 - 0.9 cm 

 LV Relative Wall Thickness        0.7                    

 LVOT Diameter                     1.4 cm                 

 LA Volume                         48.0 cm???              18 - 58 / 22 - 52 cm??? 

 

 M-MODE 

 Aortic Root Diameter MM           3.4 cm                 

 LA Systolic Diameter MM           3.6 cm                 

 LA Ao Ratio MM                    1.0                    

 AV Cusp Separation MM             2.2 cm                 

 

 DOPPLER 

 AV Peak Velocity                  224.1 cm/s             

 AV Peak Gradient                  20.1 mmHg              

 AV Mean Velocity                  172.3 cm/s             

 AV Mean Gradient                  12.9 mmHg              

 AV Velocity Time Integral         40.7 cm                

 LVOT Peak Velocity                209.5 cm/s             

 LVOT Peak Gradient                17.6 mmHg              

 LVOT Velocity Time Integral       41.0 cm                

 LVOT Stroke Volume                63.8 cm???               

 LVOT Stroke Volume Index          29.7 ml/m???             

 LVOT Cardiac Index                2740.2 cm???/min???m???      

 AV Area Cont Eq vti               1.6 cm???                

 AV Area Cont Eq pk                1.5 cm???                

 MV Area PHT                       2.9 cm???                

 Mitral E Point Velocity           68.8 cm/s              

 Mitral A Point Velocity           103.9 cm/s             

 Mitral E to A Ratio               0.7                    

 MV Deceleration Time              257.2 ms               

 MV E' Velocity                    7.4 cm/s               

 Mitral E to MV E' Ratio           9.3                    

 TR Peak Velocity                  180.8 cm/s             

 TR Peak Gradient                  13.1 mmHg              

 Right Ventricular Systolic Press  18.1 mmHg              

 

 

 FINDINGS 

 Left Ventricle 

 Moderately increased left ventricular wall thickness. Normal left ventricular  

 systolic function with no obvious regional wall motion abnormalities. Left  

 ventricular ejection fraction is estimated at 55-60 %. 

 

 Right Ventricle 

 Normal right ventricular size and function. 

 

 Right Atrium 

 Normal right atrial size. 

 

 Left Atrium 

 Normal left atrial size. 

 

 Mitral Valve 

 Structurally normal mitral valve. No mitral stenosis, regurgitation or  

 prolapse. 

 

 Aortic Valve 

 Trileaflet aortic valve. Diffuse thickening of the aortic valve cusps with  

 reduced excursion. Mild aortic stenosis with a peak gradient of 20 mmHg and a  

 mean gradient of 13 mmHg. LVOT obstruction noted as well. LVOT peak velocity  

 210 cm/s. No aortic regurgitation. 

 

 Tricuspid Valve 

 Structurally normal tricuspid valve. Mild tricuspid regurgitation. 

 

 Pulmonic Valve 

 Trace pulmonic regurgitation. 

 

 Pericardium 

 No pericardial effusion. 

 

 Aorta 

 Normal size aortic root and proximal ascending aorta. 

 

 CONCLUSIONS 

 Left ventricular ejection fraction 55-60% 

 Moderate increased left ventricular wall thickness 1.4-1.5 cm 

 Mild aortic stenosis with mean gradient 13 mmHg 

 Mild LVOT obstruction noted with peak velocity 2.1 m/s 

 Mild tricuspid regurgitation 

 Previewed by:  

 Dr. Tristin James DO 

 (Electronically Signed) 

 Final Date:      2022 10:16

## 2022-11-23 NOTE — P.DS
Providers


Date of admission: 


11/22/22 14:36





Expected date of discharge: 11/23/22


Attending physician: 


Leni Morfin MD





Consults: 





                                        





11/22/22 16:47


Consult Physician Routine 


   Consulting Provider: Jermaine Coombs


   Consult Reason/Comments: Renal cell CA


   Do you want consulting provider notified?: Yes











Primary care physician: 


[facility] Course: 





Patient is a 62-year-old male with PMH of renal cell carcinoma with metastatic 

disease to the adrenal gland, status post partial resection of the right kidney,

status post resection of the left adrenal gland presents to the ED for a 

constellation of symptoms.  Patient reports undergoing partial resection of the 

right adrenal gland last Wednesday by a surgeon in [place].  Patient reports 

progressive weakness and fatigue that has been ongoing since Friday. He reports 

diarrhea over the past 2 days. His stool is described as yellow and watery. He 

denies any headache, lower extremity edema, nausea or vomiting, fever or chills,

cough, chest pain, shortness of breath, palpitations, changes in urination.  He 

reports a decreased appetite during this time. He denies any dizziness, 

numbness/weakness/tingling of the extremities.  In the ED, he was noted to be 

hypotensive with BP of 90/56 and pulse of 104.  CBC and coagulation panel was 

benign.  CMP showed sodium of 127, chloride of 95, bicarb of 16, BUN of 24, ALT 

of 69.  Troponin was 0.031, EKG showing sinus tachycardia with T-wave inversion 

and Q waves.  TSH was 5.21.  Urinalysis showed 4+ ketones, small blood. Chest x-

ray showed small right pleural effusion with patchy airspace disease, infiltrate

versus atelectasis. Patient is admitted for adrenal insuficiency. 





Patient was started on Solu-Cortef IV which improved his blood pressure. His 

generalized weakness improved as well. ACTH was 49 and Cortisol of 53.  His 

hyponatremia improved to 129 at the time of discharge.  Hypochloremia and 

elevated BUN resolved with IV hydration.  He was transitioned to hydrocortisone 

by mouth.





His diarrhea resolved. COVID 19 was negative. 





He did have an elevated TSH of 5.21 but FT4 was within normal limits.





Echocardiogram was done which showed EF of 55-60%, moderate LVH, mild tricuspid 

regurgitation, mild aortic stenosis with mild LVOT.





Patient was seen and examined.  No acute events overnight.  He is seen 

ambulating the hallways.  He reports significant improvement in his weakness.  

He has no diarrhea today.  No lightheadedness.  Patient states that he would 

like to go home to enjoy Thanksgiving with his family.





Pertinent studies include chest x-ray and echocardiogram.





General: non toxic, no distress, appears at stated age


Derm: warm, dry


Head: atraumatic, normocephalic, symmetric


Eyes: EOMI, no lid lag, anicteric sclera


Mouth: no lip lesion, mucus membranes moist


Cardiovascular: S1S2 reg, systolic murmur


Lungs: Decreased breath sounds bilateral, no rhonchi, no rales , no accessory 

muscle use


Ext: no gross muscle atrophy, no edema, no contractures


Neuro:  no focal neuro deficits


Psych: Alert, oriented, appropriate affect 





Patient be discharged home with the following instructions:





Diet: Regular


Follow up with PCP within 1-2 days of discharge.


Follow up with Endocrinology Dr. Kerr within 1 week of discharge.


Follow up with Oncology Dr. Coombs within 1 week of discharge.


Follow up with your Surgeon at [place] with the post operative appointment given

to you.


Take hydrocortisone 5 mg by mouth twice a day.


Discontinue blood pressure medication and monitor.


Take Protonix 40 mg by mouth daily as GI prophylaxis.





Discharge Diagnosis:





#Adrenal insufficiency


#Hypotension


#Hyponatremia with hypochloremia and elevated BUN


#Generalized weakness


#Diarrhea


#Elevated TSH


#Transaminitis


#Renal cell CA with metastatic disease





This complex discharge took 35 minutes to complete.


Patient Condition at Discharge: Stable





Plan - Discharge Summary


Discharge Rx Participant: No


New Discharge Prescriptions: 


New


   Hydrocortisone 5 mg PO AC-BID #60 tablet


   Pantoprazole [Protonix] 40 mg PO AC-BRKFST #30 tab





Continue


   Fexofenadine HCl [Allegra Allergy] 180 mg PO DAILY


   Inulin/Chromium Picolinate [Fiber Gummies Chew] 1 tab PO DAILY


   Cholecalciferol [Vitamin D3 (25 Mcg = 1000 Iu)] 50 mcg PO DAILY





Discontinued


   Benazepril/Hydrochlorothiazide [Lotensin Hct 20-12.5 mg Tablet] 1 tab PO 

DAILY


Discharge Medication List





Fexofenadine HCl [Allegra Allergy] 180 mg PO DAILY 04/26/21 [History]


Inulin/Chromium Picolinate [Fiber Gummies Chew] 1 tab PO DAILY 10/20/21 

[History]


Cholecalciferol [Vitamin D3 (25 Mcg = 1000 Iu)] 50 mcg PO DAILY 11/22/22 

[History]


Hydrocortisone 5 mg PO AC-BID #60 tablet 11/23/22 [Rx]


Pantoprazole [Protonix] 40 mg PO AC-BRKFST #30 tab 11/23/22 [Rx]








Follow up Appointment(s)/Referral(s): 


Stan Hinojosa MD [Primary Care Provider] - 1-2 days


Lizette Kerr MD [STAFF PHYSICIAN] - 1 Week


Jermaine Coombs MD [STAFF PHYSICIAN] - 1 Week


Activity/Diet/Wound Care/Special Instructions: 


Diet: Regular


Follow up with PCP within 1-2 days of discharge.


Follow up with Endocrinology Dr. Kerr within 1 week of discharge.


Follow up with Oncology Dr. Coombs within 1 week of discharge.


Follow up with your Surgeon at [place] with the post operative appointment given

to you.


Take all medications as advised.


Discontinue blood pressure medication and monitor.


Take Protonix 40 mg by mouth daily as GI prophylaxis.


Come back to the ED for worsening weakness, lightheadedness, chest pain or 

shortness of breath.





Discharge Disposition: HOME SELF-CARE

## 2022-11-23 NOTE — P.CONS
History of Present Illness





- Reason for Consult


Consult date: 11/23/22


renal cell carcinoma


Requesting physician: Leni Morfin





- Chief Complaint


weakness





- History of Present Illness


Mr. Leavitt is a pleasant 62-year-old man who has been seen in the past by Dr. Coombs With a past medical history of renal cell carcinoma.  He was diagnosed 

with right kidney cancer in 2018, incidental finding during CT scan for 

diverticulitis.  4/25/18 robotic-assisted right partial nephrectomy, pathology 

revealed 3.5 cm mass consistent with clear cell carcinoma, negative margins.  

June 2020 he was found to have an enlarging left adrenal mass to 3.4 cm and an 8

mm nodular area right adrenal gland.  8/25/20 left adrenalectomy, pathology 

revealed low grade clear cell renal cell carcinoma.  He continued to have 

follow-up scans that were stable.  6/6/22 CT CAP showed an enlarging right 

adrenal gland.  Dr. Coombs referred him at that time.  Patient had a right 

partial adrenalectomy last Wednesday.  He does not know the pathology yet, We'll

be following up with the Surgeon.  Patient was not placed on any steroids 

postop-though I see he was on hydrocorotisone as a home med.  He reports he has 

not seen an Endocrinologist.  2 days after surgery he noticed he was more tired,

felt weaker, noted a heightened sense of smell, diarrhea that was yellow in 

nature but, this has improved.  He denied any falls, dizziness, complained of 

some insomnia.


Pt came to eR for weakness.  Since admit he has received some fluids on some 

steroids, he is reporting feeling much better.

















Review of Systems





10 point ROS is neg except as stated in HPI





Past Medical History


Past Medical History: Cancer, Hypertension, Osteoarthritis (OA)


Additional Past Medical History / Comment(s): hx Diverticulitis, vitiligo, hx 

renal cancer.  


History of Any Multi-Drug Resistant Organisms: None Reported


Past Surgical History: Bowel Resection, Cholecystectomy


Additional Past Surgical History / Comment(s): SINUS SURG, partial rt 

nephrectomy, left adrenal gland removed, colectomy 5/2021, CYDST REMOVED FROM BA

CK, COLONOSCOPY


Past Anesthesia/Blood Transfusion Reactions: Motion Sickness, Postoperative 

Nausea & Vomiting (PONV)


Additional Past Anesthesia/Blood Transfusion Reaction / Comm: no hx blood 

transfusion


Past Psychological History: No Psychological Hx Reported


Smoking Status: Former smoker


Past Alcohol Use History: Occasional


Additional Past Alcohol Use History / Comment(s): QUIT SMOKING 2013-smoked for 

15 yrs


Past Drug Use History: Marijuana





- Past Family History


  ** Mother


Family Medical History: No Reported History





Medications and Allergies


                                Home Medications











 Medication  Instructions  Recorded  Confirmed  Type


 


Fexofenadine HCl [Allegra Allergy] 180 mg PO DAILY 04/26/21 11/22/22 History


 


Inulin/Chromium Picolinate [Fiber 1 tab PO DAILY 10/20/21 11/22/22 History





Gummies Chew]    


 


Cholecalciferol [Vitamin D3 (25 50 mcg PO DAILY 11/22/22 11/22/22 History





Mcg = 1000 Iu)]    


 


Hydrocortisone 5 mg PO AC-BID #60 tablet 11/23/22  Rx


 


Pantoprazole [Protonix] 40 mg PO AC-BRKFST #30 tab 11/23/22  Rx








                                    Allergies











Allergy/AdvReac Type Severity Reaction Status Date / Time


 


adhesive tape Allergy  RASH AND Verified 11/22/22 14:17





   BLISTERS  


 


ciprofloxacin [From Cipro] AdvReac  Nausea & Verified 11/22/22 14:17





   Vomiting  














Physical Exam


Vitals: 


                                   Vital Signs











  Temp Pulse Pulse Resp BP BP Pulse Ox


 


 11/23/22 07:35  97.7 F   87  18   129/78  97


 


 11/23/22 02:00  98.2 F   87  16   126/74  96


 


 11/22/22 20:56    86    120/68 


 


 11/22/22 20:45  97.7 F   90  17   120/76  95


 


 11/22/22 20:02   86   16    96


 


 11/22/22 18:55   89   16  127/60   100


 


 11/22/22 18:00   89   23  121/73   96


 


 11/22/22 17:30   89   19  121/73   96


 


 11/22/22 17:00   94   22  111/63   98


 


 11/22/22 16:30   86   22  112/68   97


 


 11/22/22 16:00   86   14  97/61   94 L


 


 11/22/22 15:30   89   18  98/59   95


 


 11/22/22 15:00   94   17  102/56   95


 


 11/22/22 14:30   96   18  102/56   96


 


 11/22/22 14:00   98   20  116/65   95


 


 11/22/22 13:30   93   17  105/66   97


 


 11/22/22 13:14   98   15  105/66   95


 


 11/22/22 11:59  98.4 F  104 H   24  90/56   97








                                Intake and Output











 11/22/22 11/23/22 11/23/22





 22:59 06:59 14:59


 


Intake Total  1440 


 


Output Total  800 


 


Balance  640 


 


Intake:   


 


  Intake, IV Titration  1200 





  Amount   


 


    Dextrose 5%-0.9% NaCl 1,  1200 





    000 ml @ 100 mls/hr IV .   





    Q10H TERESA Rx#:[id number]   


 


  Oral  240 


 


Output:   


 


  Urine  800 


 


Other:   


 


  Weight 99.79 kg  














- Constitutional


General appearance: average body habitus, cooperative, no acute distress





- EENT


Eyes: anicteric sclerae, EOMI


ENT: hearing grossly normal, normal oropharynx





- Neck


Neck: no lymphadenopathy





- Respiratory


Respiratory: bilateral: CTA





- Cardiovascular


Rhythm: regular


Heart sounds: normal: S1, S2


Abnormal Heart Sounds: no systolic murmur, no diastolic murmur, no rub, no S3 

Gallop, no S4 Gallop, no click, no other


  ** leg


Peripheral Edema: bilateral: None





- Gastrointestinal


General gastrointestinal: no absent bowel sounds, no decreased bowel sounds, no 

distended, no hepatomegaly, no hyperactive bowel sounds, normal bowel sounds, no

organomegaly, no rigid, no scaphoid, soft, no splenomegaly, no tenderness, no 

umbilical hernia, no ventral hernia





- Integumentary


Integumentary: normal





- Neurologic


Neurologic: CNII-XII intact





- Musculoskeletal


Musculoskeletal: strength equal bilaterally





- Psychiatric


Psychiatric: A&O x's 3, appropriate affect, intact judgment & insight





Results


CBC & Chem 7: 


                                 11/23/22 05:36





                                 11/23/22 05:36


Labs: 


                  Abnormal Lab Results - Last 24 Hours (Table)











  11/22/22 11/22/22 11/22/22 Range/Units





  12:07 12:07 13:55 


 


Eosinophils #  0.8 H    (0-0.7)  k/uL


 


Sodium   127 L   (137-145)  mmol/L


 


Chloride   95 L   ()  mmol/L


 


Carbon Dioxide   16 L   (22-30)  mmol/L


 


BUN   24 H   (9-20)  mg/dL


 


POC Glucose (mg/dL)     ()  mg/dL


 


ALT   69 H   (4-49)  U/L


 


TSH   5.210 H   (0.465-4.680)  mIU/L


 


ACTH     (0.00-45.99)  pg/mL


 


Urine Protein    1+ H  (Negative)  


 


Urine Ketones    4+ H  (Negative)  


 


Urine Blood    Small H  (Negative)  


 


Urine Bilirubin    1+ H  (Negative)  


 


Urine WBC    6 H  (0-5)  /hpf


 


Hyaline Casts    31 H  (0-2)  /lpf


 


Urine Mucus    Moderate H  (None)  /hpf














  11/22/22 11/23/22 Range/Units





  17:10 06:19 


 


Eosinophils #    (0-0.7)  k/uL


 


Sodium    (137-145)  mmol/L


 


Chloride    ()  mmol/L


 


Carbon Dioxide    (22-30)  mmol/L


 


BUN    (9-20)  mg/dL


 


POC Glucose (mg/dL)   169 H  ()  mg/dL


 


ALT    (4-49)  U/L


 


TSH    (0.465-4.680)  mIU/L


 


ACTH  49.00 H   (0.00-45.99)  pg/mL


 


Urine Protein    (Negative)  


 


Urine Ketones    (Negative)  


 


Urine Blood    (Negative)  


 


Urine Bilirubin    (Negative)  


 


Urine WBC    (0-5)  /hpf


 


Hyaline Casts    (0-2)  /lpf


 


Urine Mucus    (None)  /hpf











Comments: 





ECHO report reviewed


Chest x-ray: report reviewed





Assessment and Plan


(1) Weakness


Status: Acute   Priority: High   Code(s): R53.1 - WEAKNESS   SNOMED Code(s): 

92435770


   





(2) Adrenal tumor


Status: Acute   Priority: High   Code(s): D49.7 - NEOPLM OF UNSP BEHAV OF ENDO 

GLANDS AND OTH PRT NERVOUS SYS   SNOMED Code(s): 771656975


   





(3) Renal cell cancer


Status: Chronic   Priority: High   Code(s): C64.9 - MALIGNANT NEOPLASM OF UNSP 

KIDNEY, EXCEPT RENAL PELVIS   SNOMED Code(s): 632463588


   


Plan: 


Adrenal tumor removed last Wed, pending path.  Pt will f/u for that.


Symptoms stated about 2 days after surgery.  Possibly adrenal insufficiency.  

Patient is reporting feeling much better after steroids and hydration.  

Recommendation is for Endocrinology assessment.  Pt may require Steroids for 

adrenal insufficiency for now.  Close f/u as the remaining adrenal gland adapts.

   











 attests: I have seen and examined pt, performed H&P, developed impression and

plan of care.  Discussed with dictator.  Agree with documentation, dictated as a

scribe.  


Time with Patient: Greater than 30

## 2022-12-08 ENCOUNTER — HOSPITAL ENCOUNTER (OUTPATIENT)
Dept: HOSPITAL 47 - LABWHC1 | Age: 62
Discharge: HOME | End: 2022-12-08
Attending: INTERNAL MEDICINE
Payer: COMMERCIAL

## 2022-12-08 DIAGNOSIS — E27.40: Primary | ICD-10-CM

## 2022-12-08 LAB
ALBUMIN SERPL-MCNC: 4.3 G/DL (ref 3.8–4.9)
ALBUMIN/GLOB SERPL: 1.32 G/DL (ref 1.6–3.17)
ALP SERPL-CCNC: 59 U/L (ref 41–126)
ALT SERPL-CCNC: 45 U/L (ref 10–49)
ANION GAP SERPL CALC-SCNC: 11.5 MMOL/L (ref 10–18)
AST SERPL-CCNC: 28 U/L (ref 14–35)
BUN SERPL-SCNC: 15.2 MG/DL (ref 9–27)
BUN/CREAT SERPL: 12.26 RATIO (ref 12–20)
CALCIUM SPEC-MCNC: 10.4 MG/DL (ref 8.7–10.3)
CHLORIDE SERPL-SCNC: 103 MMOL/L (ref 96–109)
CO2 SERPL-SCNC: 25.5 MMOL/L (ref 20–27.5)
GLOBULIN SER CALC-MCNC: 3.3 G/DL (ref 1.6–3.3)
GLUCOSE SERPL-MCNC: 98 MG/DL (ref 70–110)
POTASSIUM SERPL-SCNC: 4.7 MMOL/L (ref 3.5–5.5)
PROT SERPL-MCNC: 7.6 G/DL (ref 6.2–8.2)
SODIUM SERPL-SCNC: 140 MMOL/L (ref 135–145)

## 2022-12-08 PROCEDURE — 80053 COMPREHEN METABOLIC PANEL: CPT

## 2022-12-08 PROCEDURE — 36415 COLL VENOUS BLD VENIPUNCTURE: CPT

## 2022-12-08 PROCEDURE — 82024 ASSAY OF ACTH: CPT

## 2022-12-08 PROCEDURE — 82533 TOTAL CORTISOL: CPT

## 2023-02-28 NOTE — P.GSHP
History of Present Illness


H&P Date: 07/19/21








CHIEF COMPLAINT: Cholecystitis 





HISTORY OF PRESENT ILLNESS: The patient is a 61-year-old male who presents with 

history of epigastric including right upper quadrant abdominal pain.  He 

underwent diagnostic studies for the gallbladder.  Separately his clinical 

picture was consistent with cholecystitis.  Now he presents for surgical 

intervention.





PAST MEDICAL HISTORY: 


Please see list





PAST SURGICAL HISTORY: 


Please see list





MEDICATIONS: 


Please see list





ALLERGIES: Denies. 





SOCIAL HISTORY: No illicit drug use or recent tobacco use





FAMILY HISTORY: Pertinent for gallbladder disease 





REVIEW OF ORGAN SYSTEMS: 


CONSTITUTIONAL: No reports of fevers or chills. 


HEENT: Denies any troubles with the vision or hearing. 





PHYSICAL EXAM: 


VITAL SIGNS: 


Afebrile vital signs stable


GENERAL: Well-developed pleasant male in no acute distress. 


HEENT: No scleral icterus. Extraocular movements grossly intact. Moist buccal 

mucosa. 


NECK: Supple without lymphadenopathy. 


CHEST: Unlabored respirations. Equal bilateral excursions. 


CARDIOVASCULAR: Regular rate regular rhythm rhythm. Distal 2+ pulses. 


ABDOMEN: Soft, nondistended.  Tender along the epigastrium and right upper 

quadrant.


MUSCULOSKELETAL: No clubbing, cyanosis, or edema. 


NEURO : No focal or lateralizing signs.  Cranial nerves II-12 within normal 

limits. 


PSYCH: Alert and oriented to person, place and time. 


SKIN: Well perfused.  Good skin turgor.





ASSESSMENT: 


1. Epigastric and right upper quadrant abdominal pain


2. Chronic cholecystitis





PLAN: 


1.  Will need a robotic cholecystectomy possible open.  Benefits and risks were 

described. 


2.  Heparin for DVT prophylaxis 5000 units.


3.  Antibiotic prophylaxis.





Past Medical History


Past Medical History: Cancer, Hypertension, Osteoarthritis (OA)


Additional Past Medical History / Comment(s): hx Diverticulitis. small hiatal 

hernia, gallstones, vitiligo, hx renal cancer.  


History of Any Multi-Drug Resistant Organisms: None Reported


Past Surgical History: Bowel Resection


Additional Past Surgical History / Comment(s): SINUS SURG, partial rt 

nephrectomy, left adrenal gland removed, colectomy 5/2021


Past Anesthesia/Blood Transfusion Reactions: Motion Sickness, Postoperative 

Nausea & Vomiting (PONV)


Additional Past Anesthesia/Blood Transfusion Reaction / Comment(s): no hx blood 

transfusion


Smoking Status: Former smoker





- Past Family History


  ** Mother


Family Medical History: No Reported History





Medications and Allergies


                                Home Medications











 Medication  Instructions  Recorded  Confirmed  Type


 


Benazepril/Hydrochlorothiazide 1 tab PO QAM 04/24/15 07/15/21 History





[Lotensin Hct 20-12.5 mg Tablet]    


 


amLODIPine [Norvasc] 10 mg PO QAM 03/14/21 07/15/21 History


 


Fexofenadine HCl [Allegra Allergy] 180 mg PO DAILY 04/26/21 07/15/21 History


 


Zinc 50 mg PO DAILY 04/26/21 07/15/21 History








                                    Allergies











Allergy/AdvReac Type Severity Reaction Status Date / Time


 


No Known Allergies Allergy   Verified 07/15/21 14:09 Clear bilaterally, pupils equal, round and reactive to light.

## 2023-06-14 ENCOUNTER — HOSPITAL ENCOUNTER (OUTPATIENT)
Dept: HOSPITAL 47 - LABWHC1 | Age: 63
Discharge: HOME | End: 2023-06-14
Attending: INTERNAL MEDICINE
Payer: COMMERCIAL

## 2023-06-14 DIAGNOSIS — E27.40: Primary | ICD-10-CM

## 2023-06-14 PROCEDURE — 36415 COLL VENOUS BLD VENIPUNCTURE: CPT

## 2023-06-14 PROCEDURE — 82024 ASSAY OF ACTH: CPT

## 2023-06-14 PROCEDURE — 82533 TOTAL CORTISOL: CPT

## 2023-12-06 ENCOUNTER — HOSPITAL ENCOUNTER (OUTPATIENT)
Dept: HOSPITAL 47 - LABWHC1 | Age: 63
Discharge: HOME | End: 2023-12-06
Attending: INTERNAL MEDICINE
Payer: COMMERCIAL

## 2023-12-06 DIAGNOSIS — E03.8: Primary | ICD-10-CM

## 2023-12-06 DIAGNOSIS — E89.6: ICD-10-CM

## 2023-12-06 PROCEDURE — 36415 COLL VENOUS BLD VENIPUNCTURE: CPT

## 2023-12-06 PROCEDURE — 82533 TOTAL CORTISOL: CPT

## 2023-12-06 PROCEDURE — 82024 ASSAY OF ACTH: CPT

## 2023-12-06 PROCEDURE — 84443 ASSAY THYROID STIM HORMONE: CPT

## 2024-06-04 ENCOUNTER — HOSPITAL ENCOUNTER (OUTPATIENT)
Dept: HOSPITAL 47 - LABWHC1 | Age: 64
Discharge: HOME | End: 2024-06-04
Attending: INTERNAL MEDICINE
Payer: COMMERCIAL

## 2024-06-04 DIAGNOSIS — E89.6: Primary | ICD-10-CM

## 2024-06-04 LAB
ALBUMIN SERPL-MCNC: 4.6 G/DL (ref 3.8–4.9)
ALBUMIN/GLOB SERPL: 1.64 RATIO (ref 1.6–3.17)
ALP SERPL-CCNC: 47 U/L (ref 41–126)
ALT SERPL-CCNC: 34 U/L (ref 10–49)
ANION GAP SERPL CALC-SCNC: 11.8 MMOL/L (ref 4–12)
AST SERPL-CCNC: 28 U/L (ref 14–35)
BUN SERPL-SCNC: 22.3 MG/DL (ref 9–27)
BUN/CREAT SERPL: 20.27 RATIO (ref 12–20)
CALCIUM SPEC-MCNC: 9.6 MG/DL (ref 8.7–10.3)
CHLORIDE SERPL-SCNC: 104 MMOL/L (ref 96–109)
CO2 SERPL-SCNC: 24.2 MMOL/L (ref 21.6–31.8)
GLOBULIN SER CALC-MCNC: 2.8 G/DL (ref 1.6–3.3)
GLUCOSE SERPL-MCNC: 90 MG/DL (ref 70–110)
POTASSIUM SERPL-SCNC: 4.5 MMOL/L (ref 3.5–5.5)
PROT SERPL-MCNC: 7.4 G/DL (ref 6.2–8.2)
SODIUM SERPL-SCNC: 140 MMOL/L (ref 135–145)

## 2024-06-04 PROCEDURE — 80053 COMPREHEN METABOLIC PANEL: CPT

## 2024-06-04 PROCEDURE — 36415 COLL VENOUS BLD VENIPUNCTURE: CPT

## 2024-06-04 PROCEDURE — 82024 ASSAY OF ACTH: CPT

## 2024-06-04 PROCEDURE — 82533 TOTAL CORTISOL: CPT

## 2024-12-03 ENCOUNTER — HOSPITAL ENCOUNTER (OUTPATIENT)
Dept: HOSPITAL 47 - LABWHC1 | Age: 64
Discharge: HOME | End: 2024-12-03
Attending: INTERNAL MEDICINE
Payer: MEDICARE

## 2024-12-03 DIAGNOSIS — E89.6: Primary | ICD-10-CM

## 2024-12-03 LAB
ALBUMIN SERPL-MCNC: 4.4 G/DL (ref 3.8–4.9)
ALBUMIN/GLOB SERPL: 1.47 RATIO (ref 1.6–3.17)
ALP SERPL-CCNC: 54 U/L (ref 41–126)
ALT SERPL-CCNC: 32 U/L (ref 10–49)
ANION GAP SERPL CALC-SCNC: 9.9 MMOL/L (ref 4–12)
AST SERPL-CCNC: 25 U/L (ref 14–35)
BUN SERPL-SCNC: 15.2 MG/DL (ref 9–27)
BUN/CREAT SERPL: 13.82 RATIO (ref 12–20)
CALCIUM SPEC-MCNC: 9.9 MG/DL (ref 8.7–10.3)
CHLORIDE SERPL-SCNC: 103 MMOL/L (ref 96–109)
CO2 SERPL-SCNC: 26.1 MMOL/L (ref 21.6–31.8)
GLOBULIN SER CALC-MCNC: 3 G/DL (ref 1.6–3.3)
GLUCOSE SERPL-MCNC: 85 MG/DL (ref 70–110)
POTASSIUM SERPL-SCNC: 4.6 MMOL/L (ref 3.5–5.5)
PROT SERPL-MCNC: 7.4 G/DL (ref 6.2–8.2)
SODIUM SERPL-SCNC: 139 MMOL/L (ref 135–145)
T4 FREE SERPL-MCNC: 1.17 NG/DL (ref 0.8–1.8)

## 2024-12-03 PROCEDURE — 80053 COMPREHEN METABOLIC PANEL: CPT

## 2024-12-03 PROCEDURE — 36415 COLL VENOUS BLD VENIPUNCTURE: CPT

## 2024-12-03 PROCEDURE — 82533 TOTAL CORTISOL: CPT

## 2024-12-03 PROCEDURE — 84146 ASSAY OF PROLACTIN: CPT

## 2024-12-03 PROCEDURE — 84443 ASSAY THYROID STIM HORMONE: CPT

## 2024-12-03 PROCEDURE — 84439 ASSAY OF FREE THYROXINE: CPT

## 2025-06-11 ENCOUNTER — HOSPITAL ENCOUNTER (OUTPATIENT)
Dept: HOSPITAL 47 - LABWHC1 | Age: 65
Discharge: HOME | End: 2025-06-11
Attending: INTERNAL MEDICINE
Payer: MEDICARE

## 2025-06-11 DIAGNOSIS — E89.6: Primary | ICD-10-CM

## 2025-06-11 LAB
ALBUMIN SERPL-MCNC: 4.1 G/DL (ref 3.8–4.9)
ALBUMIN/GLOB SERPL: 1.58 RATIO (ref 1.6–3.17)
ALP SERPL-CCNC: 47 U/L (ref 41–126)
ALT SERPL-CCNC: 30 U/L (ref 10–49)
ANION GAP SERPL CALC-SCNC: 9.6 MMOL/L (ref 4–12)
AST SERPL-CCNC: 26 U/L (ref 14–35)
BILIRUB BLD-MCNC: 0.4 MG/DL (ref 0.3–1.2)
BUN SERPL-SCNC: 18.3 MG/DL (ref 9–27)
BUN/CREAT SERPL: 16.64 RATIO (ref 12–20)
CALCIUM SPEC-MCNC: 9.1 MG/DL (ref 8.7–10.3)
CHLORIDE SERPL-SCNC: 106 MMOL/L (ref 96–109)
CO2 SERPL-SCNC: 24.4 MMOL/L (ref 21.6–31.8)
CORTIS SERPL-MCNC: 5.9 UG/DL (ref 3.1–22.4)
CREATININE: 1.1 MG/DL (ref 0.6–1.5)
GLOBULIN SER CALC-MCNC: 2.6 G/DL (ref 1.6–3.3)
GLUCOSE SERPL-MCNC: 98 MG/DL (ref 70–110)
POTASSIUM SERPL-SCNC: 4.4 MMOL/L (ref 3.5–5.5)
PROT SERPL-MCNC: 6.7 G/DL (ref 6.2–8.2)
SODIUM SERPL-SCNC: 140 MMOL/L (ref 135–145)

## 2025-06-11 PROCEDURE — 36415 COLL VENOUS BLD VENIPUNCTURE: CPT

## 2025-06-11 PROCEDURE — 82024 ASSAY OF ACTH: CPT

## 2025-06-11 PROCEDURE — 82533 TOTAL CORTISOL: CPT

## 2025-06-11 PROCEDURE — 80053 COMPREHEN METABOLIC PANEL: CPT
